# Patient Record
Sex: FEMALE | Race: WHITE | NOT HISPANIC OR LATINO | Employment: OTHER | RURAL
[De-identification: names, ages, dates, MRNs, and addresses within clinical notes are randomized per-mention and may not be internally consistent; named-entity substitution may affect disease eponyms.]

---

## 2019-12-12 ENCOUNTER — OFFICE VISIT (OUTPATIENT)
Dept: FAMILY MEDICINE CLINIC | Facility: CLINIC | Age: 69
End: 2019-12-12

## 2019-12-12 ENCOUNTER — APPOINTMENT (OUTPATIENT)
Dept: LAB | Facility: HOSPITAL | Age: 69
End: 2019-12-12

## 2019-12-12 VITALS
DIASTOLIC BLOOD PRESSURE: 87 MMHG | HEIGHT: 66 IN | OXYGEN SATURATION: 97 % | HEART RATE: 106 BPM | WEIGHT: 229 LBS | RESPIRATION RATE: 18 BRPM | TEMPERATURE: 98.2 F | BODY MASS INDEX: 36.8 KG/M2 | SYSTOLIC BLOOD PRESSURE: 127 MMHG

## 2019-12-12 DIAGNOSIS — D64.9 ANEMIA, UNSPECIFIED TYPE: ICD-10-CM

## 2019-12-12 DIAGNOSIS — M79.7 FIBROMYALGIA: Primary | ICD-10-CM

## 2019-12-12 DIAGNOSIS — Z13.29 SCREENING FOR THYROID DISORDER: ICD-10-CM

## 2019-12-12 DIAGNOSIS — Z13.1 SCREENING FOR DIABETES MELLITUS: ICD-10-CM

## 2019-12-12 DIAGNOSIS — E66.9 OBESITY (BMI 35.0-39.9 WITHOUT COMORBIDITY): ICD-10-CM

## 2019-12-12 PROCEDURE — 85007 BL SMEAR W/DIFF WBC COUNT: CPT | Performed by: NURSE PRACTITIONER

## 2019-12-12 PROCEDURE — 80053 COMPREHEN METABOLIC PANEL: CPT | Performed by: NURSE PRACTITIONER

## 2019-12-12 PROCEDURE — 85025 COMPLETE CBC W/AUTO DIFF WBC: CPT | Performed by: NURSE PRACTITIONER

## 2019-12-12 PROCEDURE — 83540 ASSAY OF IRON: CPT | Performed by: NURSE PRACTITIONER

## 2019-12-12 PROCEDURE — 99214 OFFICE O/P EST MOD 30 MIN: CPT | Performed by: NURSE PRACTITIONER

## 2019-12-12 PROCEDURE — 82607 VITAMIN B-12: CPT | Performed by: NURSE PRACTITIONER

## 2019-12-12 PROCEDURE — 84443 ASSAY THYROID STIM HORMONE: CPT | Performed by: NURSE PRACTITIONER

## 2019-12-12 PROCEDURE — 84466 ASSAY OF TRANSFERRIN: CPT | Performed by: NURSE PRACTITIONER

## 2019-12-12 RX ORDER — DULOXETINE 40 MG/1
1 CAPSULE, DELAYED RELEASE ORAL DAILY
COMMUNITY
Start: 2019-12-02 | End: 2020-10-14 | Stop reason: SDUPTHER

## 2019-12-12 RX ORDER — TRAZODONE HYDROCHLORIDE 50 MG/1
1 TABLET ORAL NIGHTLY
COMMUNITY
Start: 2019-12-02 | End: 2020-07-30 | Stop reason: SDUPTHER

## 2019-12-12 RX ORDER — CHOLECALCIFEROL (VITAMIN D3) 25 MCG
1 TABLET,CHEWABLE ORAL DAILY
COMMUNITY

## 2019-12-12 RX ORDER — IBUPROFEN 200 MG
200 TABLET ORAL EVERY 6 HOURS PRN
COMMUNITY
End: 2022-04-25 | Stop reason: ALTCHOICE

## 2019-12-12 RX ORDER — TIZANIDINE 4 MG/1
1 TABLET ORAL EVERY 8 HOURS PRN
COMMUNITY
Start: 2019-12-02 | End: 2020-10-14 | Stop reason: SDUPTHER

## 2019-12-12 RX ORDER — GABAPENTIN 300 MG/1
300 CAPSULE ORAL NIGHTLY
Qty: 30 CAPSULE | Refills: 3 | Status: SHIPPED | OUTPATIENT
Start: 2019-12-12 | End: 2020-05-07

## 2019-12-12 RX ORDER — GABAPENTIN 300 MG/1
300 CAPSULE ORAL 2 TIMES DAILY
COMMUNITY
End: 2019-12-12 | Stop reason: SDUPTHER

## 2019-12-12 RX ORDER — LANOLIN ALCOHOL/MO/W.PET/CERES
400 CREAM (GRAM) TOPICAL DAILY
COMMUNITY

## 2019-12-13 LAB
ALBUMIN SERPL-MCNC: 4.4 G/DL (ref 3.5–5.2)
ALBUMIN/GLOB SERPL: 1.4 G/DL
ALP SERPL-CCNC: 87 U/L (ref 39–117)
ALT SERPL W P-5'-P-CCNC: 12 U/L (ref 1–33)
ANION GAP SERPL CALCULATED.3IONS-SCNC: 11.3 MMOL/L (ref 5–15)
ANISOCYTOSIS BLD QL: ABNORMAL
AST SERPL-CCNC: 11 U/L (ref 1–32)
BILIRUB SERPL-MCNC: 0.6 MG/DL (ref 0.2–1.2)
BUN BLD-MCNC: 12 MG/DL (ref 8–23)
BUN/CREAT SERPL: 12.5 (ref 7–25)
CALCIUM SPEC-SCNC: 9.6 MG/DL (ref 8.6–10.5)
CHLORIDE SERPL-SCNC: 100 MMOL/L (ref 98–107)
CO2 SERPL-SCNC: 27.7 MMOL/L (ref 22–29)
CREAT BLD-MCNC: 0.96 MG/DL (ref 0.57–1)
DEPRECATED RDW RBC AUTO: 36.7 FL (ref 37–54)
ERYTHROCYTE [DISTWIDTH] IN BLOOD BY AUTOMATED COUNT: 15.7 % (ref 12.3–15.4)
GFR SERPL CREATININE-BSD FRML MDRD: 58 ML/MIN/1.73
GLOBULIN UR ELPH-MCNC: 3.1 GM/DL
GLUCOSE BLD-MCNC: 83 MG/DL (ref 65–99)
HCT VFR BLD AUTO: 33 % (ref 34–46.6)
HGB BLD-MCNC: 10.7 G/DL (ref 12–15.9)
IRON 24H UR-MRATE: 128 MCG/DL (ref 37–145)
IRON SATN MFR SERPL: 30 % (ref 20–50)
LYMPHOCYTES # BLD MANUAL: 3.16 10*3/MM3 (ref 0.7–3.1)
LYMPHOCYTES NFR BLD MANUAL: 40.4 % (ref 19.6–45.3)
LYMPHOCYTES NFR BLD MANUAL: 9.1 % (ref 5–12)
MCH RBC QN AUTO: 21.8 PG (ref 26.6–33)
MCHC RBC AUTO-ENTMCNC: 32.4 G/DL (ref 31.5–35.7)
MCV RBC AUTO: 67.3 FL (ref 79–97)
MICROCYTES BLD QL: ABNORMAL
MONOCYTES # BLD AUTO: 0.71 10*3/MM3 (ref 0.1–0.9)
NEUTROPHILS # BLD AUTO: 3.95 10*3/MM3 (ref 1.7–7)
NEUTROPHILS NFR BLD MANUAL: 50.5 % (ref 42.7–76)
NRBC SPEC MANUAL: 2 /100 WBC (ref 0–0.2)
PLAT MORPH BLD: NORMAL
PLATELET # BLD AUTO: 305 10*3/MM3 (ref 140–450)
PMV BLD AUTO: 10.5 FL (ref 6–12)
POIKILOCYTOSIS BLD QL SMEAR: ABNORMAL
POTASSIUM BLD-SCNC: 4.3 MMOL/L (ref 3.5–5.2)
PROT SERPL-MCNC: 7.5 G/DL (ref 6–8.5)
RBC # BLD AUTO: 4.9 10*6/MM3 (ref 3.77–5.28)
SODIUM BLD-SCNC: 139 MMOL/L (ref 136–145)
TARGETS BLD QL SMEAR: ABNORMAL
TIBC SERPL-MCNC: 422 MCG/DL (ref 298–536)
TRANSFERRIN SERPL-MCNC: 283 MG/DL (ref 200–360)
TSH SERPL DL<=0.05 MIU/L-ACNC: 2.07 UIU/ML (ref 0.27–4.2)
VIT B12 BLD-MCNC: >2000 PG/ML (ref 211–946)
WBC MORPH BLD: NORMAL
WBC NRBC COR # BLD: 7.82 10*3/MM3 (ref 3.4–10.8)

## 2019-12-23 ENCOUNTER — TELEPHONE (OUTPATIENT)
Dept: FAMILY MEDICINE CLINIC | Facility: CLINIC | Age: 69
End: 2019-12-23

## 2019-12-23 DIAGNOSIS — D50.9 IRON DEFICIENCY ANEMIA, UNSPECIFIED IRON DEFICIENCY ANEMIA TYPE: Primary | ICD-10-CM

## 2019-12-23 RX ORDER — FERROUS SULFATE 325(65) MG
325 TABLET ORAL
Qty: 30 TABLET | Refills: 5 | Status: SHIPPED | OUTPATIENT
Start: 2019-12-23 | End: 2020-09-08

## 2019-12-23 NOTE — TELEPHONE ENCOUNTER
----- Message from LAVELL Hood sent at 12/23/2019  4:58 PM CST -----  I've sent in her some iron.  She needs to parag her levels in 6 weeks.

## 2019-12-26 ENCOUNTER — TELEPHONE (OUTPATIENT)
Dept: FAMILY MEDICINE CLINIC | Facility: CLINIC | Age: 69
End: 2019-12-26

## 2019-12-26 NOTE — TELEPHONE ENCOUNTER
Mail service is asking for a electronic order to be sent over for the gabapentin so that they can get it sent out to here.

## 2019-12-27 NOTE — TELEPHONE ENCOUNTER
Spoke with patient advised that gabapentin is a scheduled drug in the state of [place] and she has already been giving a script for it this month.  Can not give another.

## 2020-01-17 ENCOUNTER — TELEPHONE (OUTPATIENT)
Dept: FAMILY MEDICINE CLINIC | Facility: CLINIC | Age: 70
End: 2020-01-17

## 2020-01-17 DIAGNOSIS — D64.9 ANEMIA, UNSPECIFIED TYPE: Primary | ICD-10-CM

## 2020-01-17 NOTE — TELEPHONE ENCOUNTER
Patient called in stating she was told to come back in a few weeks to have her iron checked. She has requested labs be ordered so she can go ahead and have them completed. Please call patient once labs are active. Call back number is [phone]

## 2020-01-27 ENCOUNTER — LAB (OUTPATIENT)
Dept: LAB | Facility: HOSPITAL | Age: 70
End: 2020-01-27

## 2020-01-27 DIAGNOSIS — D64.9 ANEMIA, UNSPECIFIED TYPE: ICD-10-CM

## 2020-01-27 PROCEDURE — 83540 ASSAY OF IRON: CPT

## 2020-01-27 PROCEDURE — 84466 ASSAY OF TRANSFERRIN: CPT

## 2020-01-28 LAB
IRON 24H UR-MRATE: 102 MCG/DL (ref 37–145)
IRON SATN MFR SERPL: 25 % (ref 20–50)
TIBC SERPL-MCNC: 413 MCG/DL (ref 298–536)
TRANSFERRIN SERPL-MCNC: 277 MG/DL (ref 200–360)

## 2020-02-13 ENCOUNTER — TELEPHONE (OUTPATIENT)
Dept: FAMILY MEDICINE CLINIC | Facility: CLINIC | Age: 70
End: 2020-02-13

## 2020-02-13 DIAGNOSIS — R07.0 THROAT DISCOMFORT: Primary | ICD-10-CM

## 2020-02-13 NOTE — TELEPHONE ENCOUNTER
Pt requested a referral put in for the ENT that practices upstairs, either Dr. Medrano or Dr. Winters.    Sofía can be reached at [phone]

## 2020-02-19 ENCOUNTER — OFFICE VISIT (OUTPATIENT)
Dept: OTOLARYNGOLOGY | Facility: CLINIC | Age: 70
End: 2020-02-19

## 2020-02-19 VITALS — HEIGHT: 66 IN | TEMPERATURE: 96.7 F | WEIGHT: 231 LBS | BODY MASS INDEX: 37.12 KG/M2

## 2020-02-19 DIAGNOSIS — R13.14 PHARYNGOESOPHAGEAL DYSPHAGIA: ICD-10-CM

## 2020-02-19 DIAGNOSIS — K21.9 LARYNGOPHARYNGEAL REFLUX (LPR): Primary | ICD-10-CM

## 2020-02-19 PROCEDURE — 99203 OFFICE O/P NEW LOW 30 MIN: CPT | Performed by: OTOLARYNGOLOGY

## 2020-02-19 PROCEDURE — 31575 DIAGNOSTIC LARYNGOSCOPY: CPT | Performed by: OTOLARYNGOLOGY

## 2020-02-19 RX ORDER — SUCRALFATE 1 G/1
1 TABLET ORAL NIGHTLY
Qty: 30 TABLET | Refills: 2 | Status: SHIPPED | OUTPATIENT
Start: 2020-02-19 | End: 2020-06-01

## 2020-02-19 RX ORDER — OMEPRAZOLE 40 MG/1
40 CAPSULE, DELAYED RELEASE ORAL DAILY
Qty: 30 CAPSULE | Refills: 3 | Status: SHIPPED | OUTPATIENT
Start: 2020-02-19 | End: 2020-07-17

## 2020-02-19 NOTE — PROGRESS NOTES
Subjective   Sofía Clarke is a 69 y.o. female.   Choking episodes  History of Present Illness   Patient has a known history of reflux and hiatal hernia that she is currently not being treated she has had EGDs before and said she has stomach polyps and had a hiatal hernia.  She says it 2 and half years ago she choked sharp piece of chicken and had no further choking episodes until recently last week or 2 where she choked on cheeseburger.  She states she normally controls her swallowing by eating small bites but may have eaten a larger bite she not any neck mass nor said no sore throat occasionally has some hoarseness those not severe and has a lump sensation globus in her throat      The following portions of the patient's history were reviewed and updated as appropriate: allergies, current medications, past family history, past medical history, past social history, past surgical history and problem list.      Sofía Clarke reports that she has never smoked. She has never used smokeless tobacco. She reports that she drank alcohol. She reports that she has current or past drug history. Drug: Marijuana.  Patient is not a tobacco user and has not been counseled for use of tobacco products    Family History   Problem Relation Age of Onset   • Emphysema Mother    • Dementia Mother    • Diabetes Father    • Cancer Father    • No Known Problems Sister    • Hypertension Brother    • Diabetes Paternal Grandmother    • Cancer Paternal Grandfather    • Cancer Brother          Current Outpatient Medications:   •  DULoxetine HCl 40 MG capsule delayed-release particles, Take 1 tablet by mouth Daily., Disp: , Rfl:   •  ferrous sulfate (IRON SUPPLEMENT) 325 (65 FE) MG tablet, Take 1 tablet by mouth Daily With Breakfast., Disp: 30 tablet, Rfl: 5  •  folic acid (FOLVITE) 400 MCG tablet, Take 400 mcg by mouth Daily., Disp: , Rfl:   •  gabapentin (NEURONTIN) 300 MG capsule, Take 1 capsule by mouth Every Night., Disp: 30 capsule, Rfl:  3  •  ibuprofen (ADVIL,MOTRIN) 200 MG tablet, Take 200 mg by mouth Every 6 (Six) Hours As Needed for Mild Pain ., Disp: , Rfl:   •  tiZANidine (ZANAFLEX) 4 MG tablet, Take 1 tablet by mouth Every 8 (Eight) Hours., Disp: , Rfl:   •  traZODone (DESYREL) 50 MG tablet, Take 1 tablet by mouth Every Night., Disp: , Rfl:   •  Cyanocobalamin (B-12) 1000 MCG capsule, Take 1 tablet by mouth Daily., Disp: , Rfl:   •  omeprazole (priLOSEC) 40 MG capsule, Take 1 capsule by mouth Daily., Disp: 30 capsule, Rfl: 3  •  sucralfate (CARAFATE) 1 g tablet, Take 1 tablet by mouth Every Night., Disp: 30 tablet, Rfl: 2    Allergies   Allergen Reactions   • Aspirin GI Intolerance   • Codeine Nausea And Vomiting   • Lyrica [Pregabalin] Mental Status Change   • Penicillins GI Intolerance       History reviewed. No pertinent past medical history.    Past Surgical History:   Procedure Laterality Date   • CARDIAC SURGERY  2000    cath ablation-vein   • WISDOM TOOTH EXTRACTION         Review of Systems   HENT: Positive for congestion, sore throat, trouble swallowing and voice change.    Eyes: Positive for visual disturbance.   Respiratory: Positive for choking and wheezing.    Cardiovascular: Positive for chest pain.   Gastrointestinal: Positive for vomiting.   Musculoskeletal: Positive for back pain.   Hematological: Bruises/bleeds easily.   All other systems reviewed and are negative.          Objective   Physical Exam   Constitutional: She appears well-developed.   HENT:   Head: Normocephalic.   Right Ear: Hearing, tympanic membrane and external ear normal.   Left Ear: Hearing, tympanic membrane, external ear and ear canal normal.   Nose: Nose normal.   Mouth/Throat: Uvula is midline, oropharynx is clear and moist and mucous membranes are normal. Tonsils are 2+ on the right. Tonsils are 2+ on the left. No tonsillar exudate.   Eyes: Conjunctivae are normal.   Neck: Neck supple. No tracheal deviation present. No thyromegaly present.        Pulmonary/Chest: Effort normal.   Lymphadenopathy:     She has no cervical adenopathy.   Neurological: She is alert.   Skin: Skin is warm.   Psychiatric: She has a normal mood and affect. Thought content normal.   Vitals reviewed.        Procedure Note    Pre-operative Diagnosis:   Chief Complaint   Patient presents with   • Sore Throat     ref: Keith Anand choking episode    Post-operative Diagnosis: same    Anesthesia: topical with xylocaine and neosynephrine    Endoscopy Type:  Flexible Laryngoscopy    Procedure Details:    The patient was placed in the sitting position.  After topical anesthesia and decongestion, the 4 mm laryngoscope was passed.  The nasal cavities, nasopharynx, oropharynx, hypopharynx, and larynx were all examined.  Vocal cords were examined during respiration and phonation.  The following findings were noted:    Findings: Previously noted nasal findings were confirmed. Nasopharynx without mass, hypopharynx and larynx without evidence of neoplasm. Vocal cord mobility intact. There is chronic appearing edema and erythema of the laryngeal structures consistent with chronic laryngitis.    Condition:  Stable.  Patient tolerated procedure well.    Complications:  None  Evidence of foreign body was seen in no mass there is mild diffuse swelling the base of tongue and posterior larynx her airway is good  Assessment/Plan   Sofía was seen today for sore throat.    Diagnoses and all orders for this visit:    Laryngopharyngeal reflux (LPR)    Pharyngoesophageal dysphagia    Other orders  -     sucralfate (CARAFATE) 1 g tablet; Take 1 tablet by mouth Every Night.  -     omeprazole (priLOSEC) 40 MG capsule; Take 1 capsule by mouth Daily.      Has several options including getting a video swallow but since she is only had 2 episodes of this I think it be appropriate to treat that the LPR which she is known to have we discussed the importance of reflux precautions weight loss not eating late at  night and how to take omeprazole on empty stomach 45 minutes before she eats and also to take the Carafate at night  We will reassess in 6 weeks if she has any problems or questions she is let us know if she notes no improvement next 3 to 4 weeks she is to let us know prior to follow-up

## 2020-02-19 NOTE — PATIENT INSTRUCTIONS

## 2020-03-12 ENCOUNTER — APPOINTMENT (OUTPATIENT)
Dept: LAB | Facility: HOSPITAL | Age: 70
End: 2020-03-12

## 2020-03-12 ENCOUNTER — PROCEDURE VISIT (OUTPATIENT)
Dept: FAMILY MEDICINE CLINIC | Facility: CLINIC | Age: 70
End: 2020-03-12

## 2020-03-12 VITALS
TEMPERATURE: 98 F | SYSTOLIC BLOOD PRESSURE: 98 MMHG | HEIGHT: 66 IN | DIASTOLIC BLOOD PRESSURE: 78 MMHG | BODY MASS INDEX: 37.28 KG/M2 | WEIGHT: 232 LBS | RESPIRATION RATE: 20 BRPM | HEART RATE: 115 BPM | OXYGEN SATURATION: 97 %

## 2020-03-12 DIAGNOSIS — M25.50 ARTHRALGIA, UNSPECIFIED JOINT: ICD-10-CM

## 2020-03-12 DIAGNOSIS — Z12.4 ENCOUNTER FOR SCREENING FOR CERVICAL CANCER: Primary | ICD-10-CM

## 2020-03-12 DIAGNOSIS — E66.01 MORBIDLY OBESE (HCC): ICD-10-CM

## 2020-03-12 DIAGNOSIS — Z12.31 ENCOUNTER FOR SCREENING MAMMOGRAM FOR MALIGNANT NEOPLASM OF BREAST: ICD-10-CM

## 2020-03-12 PROCEDURE — 86038 ANTINUCLEAR ANTIBODIES: CPT | Performed by: NURSE PRACTITIONER

## 2020-03-12 PROCEDURE — 86140 C-REACTIVE PROTEIN: CPT | Performed by: NURSE PRACTITIONER

## 2020-03-12 PROCEDURE — 85651 RBC SED RATE NONAUTOMATED: CPT | Performed by: NURSE PRACTITIONER

## 2020-03-12 PROCEDURE — G0123 SCREEN CERV/VAG THIN LAYER: HCPCS | Performed by: NURSE PRACTITIONER

## 2020-03-12 PROCEDURE — 86431 RHEUMATOID FACTOR QUANT: CPT | Performed by: NURSE PRACTITIONER

## 2020-03-12 PROCEDURE — 99213 OFFICE O/P EST LOW 20 MIN: CPT | Performed by: NURSE PRACTITIONER

## 2020-03-12 NOTE — PROGRESS NOTES
"Subjective   History of Present Illness    Sofía Clarke is a 69 y.o. female who presents for annual exam.  She also says her fibromyalgia and joint pain are getting worse.  Obstetric History:   1, para 1   Menstrual History:     No LMP recorded. Patient is postmenopausal.   went through menopause in     Sexual History:     Denies being sexually active    Current contraception: post menopausal status  History of abnormal Pap smear: no  SHARDA exposure in utero: no  Received Gardasil immunization: no  Perform regular self breast exam: yes - regular  Family history of uterine or ovarian cancer: no  Family History of cervical cancer: no  Family History of colon cancer/colon polyps: no  Regular self breast exam: yes  History of abnormal mammogram: no  Family history of breast cancer: no  History of abnormal lipids: no    The following portions of the patient's history were reviewed and updated as appropriate: allergies, current medications, past family history, past medical history, past social history, past surgical history and problem list.    Review of Systems   Constitutional: Negative.    HENT: Negative.    Eyes: Negative.    Respiratory: Negative.    Cardiovascular: Negative.    Gastrointestinal: Negative.    Genitourinary: Negative.    Musculoskeletal: Positive for arthralgias and myalgias.   Skin: Negative.    Neurological: Negative.    Psychiatric/Behavioral: Negative.        Pertinent items are noted in HPI.     Objective   Physical Exam   Constitutional: She appears well-developed and well-nourished. No distress.   Genitourinary: Rectal exam shows guaiac negative stool.   Musculoskeletal:   Tenderness multiple sites, joint pain multiple sites also    Nursing note and vitals reviewed.      BP 98/78 (BP Location: Right arm, Patient Position: Sitting, Cuff Size: Adult)   Pulse 115   Temp 98 °F (36.7 °C) (Tympanic)   Resp 20   Ht 167.6 cm (66\")   Wt 105 kg (232 lb)   SpO2 97%   BMI 37.45 kg/m² "     General:   alert, appears stated age and cooperative   Heart: regular rate and rhythm, S1, S2 normal, no murmur, click, rub or gallop   Lungs: clear to auscultation bilaterally   Abdomen: soft, non-tender, without masses or organomegaly   Breast: inspection negative, no nipple discharge or bleeding, no masses or nodularity palpable   Vulva: normal   Vagina: normal mucosa   Cervix: no lesions   Uterus: normal size, non-tender   Adnexa: normal adnexa     Assessment/Plan   Sofía was seen today for gynecologic exam and fibromyalgia.    Diagnoses and all orders for this visit:    Encounter for screening for cervical cancer   -     Liquid-based Pap Smear, Screening    Arthralgia, unspecified joint  -     Rheumatoid factor  -     C-reactive protein  -     Sedimentation rate, automated  -     KATHLEEN    Morbidly obese (CMS/HCC)  Comments:  bmi 37.5 diet and exercise info given    Encounter for screening mammogram for malignant neoplasm of breast   -     Mammo Screening Bilateral With CAD; Future        Await pap smear results.

## 2020-03-13 LAB
CHROMATIN AB SERPL-ACNC: <10 IU/ML (ref 0–14)
CRP SERPL-MCNC: 0.53 MG/DL (ref 0–0.5)
ERYTHROCYTE [SEDIMENTATION RATE] IN BLOOD: 8 MM/HR (ref 0–20)

## 2020-03-14 LAB — ANA SER QL: NEGATIVE

## 2020-03-16 PROBLEM — E66.01 MORBIDLY OBESE: Status: ACTIVE | Noted: 2020-03-16

## 2020-03-16 NOTE — PATIENT INSTRUCTIONS
Calorie Counting for Weight Loss  Calories are units of energy. Your body needs a certain amount of calories from food to keep you going throughout the day. When you eat more calories than your body needs, your body stores the extra calories as fat. When you eat fewer calories than your body needs, your body burns fat to get the energy it needs.  Calorie counting means keeping track of how many calories you eat and drink each day. Calorie counting can be helpful if you need to lose weight. If you make sure to eat fewer calories than your body needs, you should lose weight. Ask your health care provider what a healthy weight is for you.  For calorie counting to work, you will need to eat the right number of calories in a day in order to lose a healthy amount of weight per week. A dietitian can help you determine how many calories you need in a day and will give you suggestions on how to reach your calorie goal.  · A healthy amount of weight to lose per week is usually 1-2 lb (0.5-0.9 kg). This usually means that your daily calorie intake should be reduced by 500-750 calories.  · Eating 1,200 - 1,500 calories per day can help most women lose weight.  · Eating 1,500 - 1,800 calories per day can help most men lose weight.  What is my plan?  My goal is to have __________ calories per day.  If I have this many calories per day, I should lose around __________ pounds per week.  What do I need to know about calorie counting?  In order to meet your daily calorie goal, you will need to:  · Find out how many calories are in each food you would like to eat. Try to do this before you eat.  · Decide how much of the food you plan to eat.  · Write down what you ate and how many calories it had. Doing this is called keeping a food log.  To successfully lose weight, it is important to balance calorie counting with a healthy lifestyle that includes regular activity. Aim for 150 minutes of moderate exercise (such as walking) or 75  minutes of vigorous exercise (such as running) each week.  Where do I find calorie information?    The number of calories in a food can be found on a Nutrition Facts label. If a food does not have a Nutrition Facts label, try to look up the calories online or ask your dietitian for help.  Remember that calories are listed per serving. If you choose to have more than one serving of a food, you will have to multiply the calories per serving by the amount of servings you plan to eat. For example, the label on a package of bread might say that a serving size is 1 slice and that there are 90 calories in a serving. If you eat 1 slice, you will have eaten 90 calories. If you eat 2 slices, you will have eaten 180 calories.  How do I keep a food log?  Immediately after each meal, record the following information in your food log:  · What you ate. Don't forget to include toppings, sauces, and other extras on the food.  · How much you ate. This can be measured in cups, ounces, or number of items.  · How many calories each food and drink had.  · The total number of calories in the meal.  Keep your food log near you, such as in a small notebook in your pocket, or use a mobile kate or website. Some programs will calculate calories for you and show you how many calories you have left for the day to meet your goal.  What are some calorie counting tips?    · Use your calories on foods and drinks that will fill you up and not leave you hungry:  ? Some examples of foods that fill you up are nuts and nut butters, vegetables, lean proteins, and high-fiber foods like whole grains. High-fiber foods are foods with more than 5 g fiber per serving.  ? Drinks such as sodas, specialty coffee drinks, alcohol, and juices have a lot of calories, yet do not fill you up.  · Eat nutritious foods and avoid empty calories. Empty calories are calories you get from foods or beverages that do not have many vitamins or protein, such as candy, sweets, and  "soda. It is better to have a nutritious high-calorie food (such as an avocado) than a food with few nutrients (such as a bag of chips).  · Know how many calories are in the foods you eat most often. This will help you calculate calorie counts faster.  · Pay attention to calories in drinks. Low-calorie drinks include water and unsweetened drinks.  · Pay attention to nutrition labels for \"low fat\" or \"fat free\" foods. These foods sometimes have the same amount of calories or more calories than the full fat versions. They also often have added sugar, starch, or salt, to make up for flavor that was removed with the fat.  · Find a way of tracking calories that works for you. Get creative. Try different apps or programs if writing down calories does not work for you.  What are some portion control tips?  · Know how many calories are in a serving. This will help you know how many servings of a certain food you can have.  · Use a measuring cup to measure serving sizes. You could also try weighing out portions on a kitchen scale. With time, you will be able to estimate serving sizes for some foods.  · Take some time to put servings of different foods on your favorite plates, bowls, and cups so you know what a serving looks like.  · Try not to eat straight from a bag or box. Doing this can lead to overeating. Put the amount you would like to eat in a cup or on a plate to make sure you are eating the right portion.  · Use smaller plates, glasses, and bowls to prevent overeating.  · Try not to multitask (for example, watch TV or use your computer) while eating. If it is time to eat, sit down at a table and enjoy your food. This will help you to know when you are full. It will also help you to be aware of what you are eating and how much you are eating.  What are tips for following this plan?  Reading food labels  · Check the calorie count compared to the serving size. The serving size may be smaller than what you are used to " "eating.  · Check the source of the calories. Make sure the food you are eating is high in vitamins and protein and low in saturated and trans fats.  Shopping  · Read nutrition labels while you shop. This will help you make healthy decisions before you decide to purchase your food.  · Make a grocery list and stick to it.  Cooking  · Try to cook your favorite foods in a healthier way. For example, try baking instead of frying.  · Use low-fat dairy products.  Meal planning  · Use more fruits and vegetables. Half of your plate should be fruits and vegetables.  · Include lean proteins like poultry and fish.  How do I count calories when eating out?  · Ask for smaller portion sizes.  · Consider sharing an entree and sides instead of getting your own entree.  · If you get your own entree, eat only half. Ask for a box at the beginning of your meal and put the rest of your entree in it so you are not tempted to eat it.  · If calories are listed on the menu, choose the lower calorie options.  · Choose dishes that include vegetables, fruits, whole grains, low-fat dairy products, and lean protein.  · Choose items that are boiled, broiled, grilled, or steamed. Stay away from items that are buttered, battered, fried, or served with cream sauce. Items labeled \"crispy\" are usually fried, unless stated otherwise.  · Choose water, low-fat milk, unsweetened iced tea, or other drinks without added sugar. If you want an alcoholic beverage, choose a lower calorie option such as a glass of wine or light beer.  · Ask for dressings, sauces, and syrups on the side. These are usually high in calories, so you should limit the amount you eat.  · If you want a salad, choose a garden salad and ask for grilled meats. Avoid extra toppings like noble, cheese, or fried items. Ask for the dressing on the side, or ask for olive oil and vinegar or lemon to use as dressing.  · Estimate how many servings of a food you are given. For example, a serving of " cooked rice is ½ cup or about the size of half a baseball. Knowing serving sizes will help you be aware of how much food you are eating at restaurants. The list below tells you how big or small some common portion sizes are based on everyday objects:  ? 1 oz--4 stacked dice.  ? 3 oz--1 deck of cards.  ? 1 tsp--1 die.  ? 1 Tbsp--½ a ping-pong ball.  ? 2 Tbsp--1 ping-pong ball.  ? ½ cup--½ baseball.  ? 1 cup--1 baseball.  Summary  · Calorie counting means keeping track of how many calories you eat and drink each day. If you eat fewer calories than your body needs, you should lose weight.  · A healthy amount of weight to lose per week is usually 1-2 lb (0.5-0.9 kg). This usually means reducing your daily calorie intake by 500-750 calories.  · The number of calories in a food can be found on a Nutrition Facts label. If a food does not have a Nutrition Facts label, try to look up the calories online or ask your dietitian for help.  · Use your calories on foods and drinks that will fill you up, and not on foods and drinks that will leave you hungry.  · Use smaller plates, glasses, and bowls to prevent overeating.  This information is not intended to replace advice given to you by your health care provider. Make sure you discuss any questions you have with your health care provider.  Document Released: 12/18/2006 Document Revised: 09/06/2019 Document Reviewed: 11/17/2017  [employer] Interactive Patient Education © 2020 [employer]      Exercising to Lose Weight  Exercise is structured, repetitive physical activity to improve fitness and health. Getting regular exercise is important for everyone. It is especially important if you are overweight. Being overweight increases your risk of heart disease, stroke, diabetes, high blood pressure, and several types of cancer. Reducing your calorie intake and exercising can help you lose weight.  Exercise is usually categorized as moderate or vigorous intensity. To lose weight, most  people need to do a certain amount of moderate-intensity or vigorous-intensity exercise each week.  Moderate-intensity exercise    Moderate-intensity exercise is any activity that gets you moving enough to burn at least three times more energy (calories) than if you were sitting.  Examples of moderate exercise include:  · Walking a mile in 15 minutes.  · Doing light yard work.  · Biking at an easy pace.  Most people should get at least 150 minutes (2 hours and 30 minutes) a week of moderate-intensity exercise to maintain their body weight.  Vigorous-intensity exercise  Vigorous-intensity exercise is any activity that gets you moving enough to burn at least six times more calories than if you were sitting. When you exercise at this intensity, you should be working hard enough that you are not able to carry on a conversation.  Examples of vigorous exercise include:  · Running.  · Playing a team sport, such as football, basketball, and soccer.  · Jumping rope.  Most people should get at least 75 minutes (1 hour and 15 minutes) a week of vigorous-intensity exercise to maintain their body weight.  How can exercise affect me?  When you exercise enough to burn more calories than you eat, you lose weight. Exercise also reduces body fat and builds muscle. The more muscle you have, the more calories you burn. Exercise also:  · Improves mood.  · Reduces stress and tension.  · Improves your overall fitness, flexibility, and endurance.  · Increases bone strength.  The amount of exercise you need to lose weight depends on:  · Your age.  · The type of exercise.  · Any health conditions you have.  · Your overall physical ability.  Talk to your health care provider about how much exercise you need and what types of activities are safe for you.  What actions can I take to lose weight?  Nutrition    · Make changes to your diet as told by your health care provider or diet and nutrition specialist (dietitian). This may  include:  ? Eating fewer calories.  ? Eating more protein.  ? Eating less unhealthy fats.  ? Eating a diet that includes fresh fruits and vegetables, whole grains, low-fat dairy products, and lean protein.  ? Avoiding foods with added fat, salt, and sugar.  · Drink plenty of water while you exercise to prevent dehydration or heat stroke.  Activity  · Choose an activity that you enjoy and set realistic goals. Your health care provider can help you make an exercise plan that works for you.  · Exercise at a moderate or vigorous intensity most days of the week.  ? The intensity of exercise may vary from person to person. You can tell how intense a workout is for you by paying attention to your breathing and heartbeat. Most people will notice their breathing and heartbeat get faster with more intense exercise.  · Do resistance training twice each week, such as:  ? Push-ups.  ? Sit-ups.  ? Lifting weights.  ? Using resistance bands.  · Getting short amounts of exercise can be just as helpful as long structured periods of exercise. If you have trouble finding time to exercise, try to include exercise in your daily routine.  ? Get up, stretch, and walk around every 30 minutes throughout the day.  ? Go for a walk during your lunch break.  ? Park your car farther away from your destination.  ? If you take public transportation, get off one stop early and walk the rest of the way.  ? Make phone calls while standing up and walking around.  ? Take the stairs instead of elevators or escalators.  · Wear comfortable clothes and shoes with good support.  · Do not exercise so much that you hurt yourself, feel dizzy, or get very short of breath.  Where to find more information  · [place] Department of Health and Human Services: www.hhs.gov  · Centers for Disease Control and Prevention (CDC): www.cdc.gov  Contact a health care provider:  · Before starting a new exercise program.  · If you have questions or concerns about your  weight.  · If you have a medical problem that keeps you from exercising.  Get help right away if you have any of the following while exercising:  · Injury.  · Dizziness.  · Difficulty breathing or shortness of breath that does not go away when you stop exercising.  · Chest pain.  · Rapid heartbeat.  Summary  · Being overweight increases your risk of heart disease, stroke, diabetes, high blood pressure, and several types of cancer.  · Losing weight happens when you burn more calories than you eat.  · Reducing the amount of calories you eat in addition to getting regular moderate or vigorous exercise each week helps you lose weight.  This information is not intended to replace advice given to you by your health care provider. Make sure you discuss any questions you have with your health care provider.  Document Released: 01/20/2012 Document Revised: 12/31/2018 Document Reviewed: 12/31/2018  [employer] Interactive Patient Education © 2020 [employer]

## 2020-03-17 LAB
GEN CATEG CVX/VAG CYTO-IMP: NORMAL
LAB AP CASE REPORT: NORMAL
LAB AP GYN ADDITIONAL INFORMATION: NORMAL
PATH INTERP SPEC-IMP: NORMAL
STAT OF ADQ CVX/VAG CYTO-IMP: NORMAL

## 2020-04-07 ENCOUNTER — TELEPHONE (OUTPATIENT)
Dept: FAMILY MEDICINE CLINIC | Facility: CLINIC | Age: 70
End: 2020-04-07

## 2020-04-29 ENCOUNTER — OFFICE VISIT (OUTPATIENT)
Dept: OTOLARYNGOLOGY | Facility: CLINIC | Age: 70
End: 2020-04-29

## 2020-04-29 VITALS — WEIGHT: 233 LBS | BODY MASS INDEX: 37.45 KG/M2 | TEMPERATURE: 98.5 F | HEIGHT: 66 IN

## 2020-04-29 DIAGNOSIS — R13.14 PHARYNGOESOPHAGEAL DYSPHAGIA: Primary | ICD-10-CM

## 2020-04-29 PROCEDURE — 99213 OFFICE O/P EST LOW 20 MIN: CPT | Performed by: OTOLARYNGOLOGY

## 2020-04-29 RX ORDER — LANOLIN ALCOHOL/MO/W.PET/CERES
25 CREAM (GRAM) TOPICAL DAILY
COMMUNITY

## 2020-04-29 NOTE — PATIENT INSTRUCTIONS
MyPlate from USDA    MyPlate is an outline of a general healthy diet based on the 2010 Dietary Guidelines for Americans, from the U.S. Department of Agriculture (USDA). It sets guidelines for how much food you should eat from each food group based on your age, sex, and level of physical activity.  What are tips for following MyPlate?  To follow MyPlate recommendations:  · Eat a wide variety of fruits and vegetables, grains, and protein foods.  · Serve smaller portions and eat less food throughout the day.  · Limit portion sizes to avoid overeating.  · Enjoy your food.  · Get at least 150 minutes of exercise every week. This is about 30 minutes each day, 5 or more days per week.  It can be difficult to have every meal look like MyPlate. Think about MyPlate as eating guidelines for an entire day, rather than each individual meal.  Fruits and vegetables  · Make half of your plate fruits and vegetables.  · Eat many different colors of fruits and vegetables each day.  · For a 2,000 calorie daily food plan, eat:  ? 2½ cups of vegetables every day.  ? 2 cups of fruit every day.  · 1 cup is equal to:  ? 1 cup raw or cooked vegetables.  ? 1 cup raw fruit.  ? 1 medium-sized orange, apple, or banana.  ? 1 cup 100% fruit or vegetable juice.  ? 2 cups raw leafy greens, such as lettuce, spinach, or kale.  ? ½ cup dried fruit.  Grains  · One fourth of your plate should be grains.  · Make at least half of the grains you eat each day whole grains.  · For a 2,000 calorie daily food plan, eat 6 oz of grains every day.  · 1 oz is equal to:  ? 1 slice bread.  ? 1 cup cereal.  ? ½ cup cooked rice, cereal, or pasta.  Protein  · One fourth of your plate should be protein.  · Eat a wide variety of protein foods, including meat, poultry, fish, eggs, beans, nuts, and tofu.  · For a 2,000 calorie daily food plan, eat 5½ oz of protein every day.  · 1 oz is equal to:  ? 1 oz meat, poultry, or fish.  ? ¼ cup cooked beans.  ? 1 egg.  ? ½ oz nuts  or seeds.  ? 1 Tbsp peanut butter.  Dairy  · Drink fat-free or low-fat (1%) milk.  · Eat or drink dairy as a side to meals.  · For a 2,000 calorie daily food plan, eat or drink 3 cups of dairy every day.  · 1 cup is equal to:  ? 1 cup milk, yogurt, cottage cheese, or soy milk (soy beverage).  ? 2 oz processed cheese.  ? 1½ oz natural cheese.  Fats, oils, salt, and sugars  · Only small amounts of oils are recommended.  · Avoid foods that are high in calories and low in nutritional value (empty calories), like foods high in fat or added sugars.  · Choose foods that are low in salt (sodium). Choose foods that have less than 140 milligrams (mg) of sodium per serving.  · Drink water instead of sugary drinks. Drink enough water each day to keep your urine pale yellow.  Where to find support  · Work with your health care provider or a nutrition specialist (dietitian) to develop a customized eating plan that is right for you.  · Download an kate (mobile application) to help you track your daily food intake.  Where to find more information  · Go to ChooseMyPlate.gov for more information.  · Learn more and log your daily food intake according to MyPlate using USDA's SuperTracker: www.SputnikBoter.usda.gov  Summary  · MyPlate is a general guideline for healthy eating from the USDA. It is based on the 2010 Dietary Guidelines for Americans.  · In general, fruits and vegetables should take up ½ of your plate, grains should take up ¼ of your plate, and protein should take up ¼ of your plate.  This information is not intended to replace advice given to you by your health care provider. Make sure you discuss any questions you have with your health care provider.  Document Released: 01/06/2009 Document Revised: 03/19/2018 Document Reviewed: 03/19/2018  [employer] Interactive Patient Education © 2020 [employer]

## 2020-04-30 NOTE — PROGRESS NOTES
Subjective   Sofía Clarke is a 69 y.o. female.   Throat issues  History of Present Illness     She had comes back saying she has still the presence of globus she is not really having true pain is more discomfort she has trouble swallowing certain foods get caught in her throat small foods and liquids are easier swallowed is not have any neck swelling or mass is new though she is somewhat tender at times it comes and goes she is not have any fever chills no change in voice no new breathing problems she said she is had an esophagram in the past and is partly normal but it was more than 30 years ago  States she has been taking her medications but has not made too much of a difference she also has problems with bloating her stomach separately      The following portions of the patient's history were reviewed and updated as appropriate: allergies, current medications, past family history, past medical history, past social history, past surgical history and problem list.      Current Outpatient Medications:   •  Cyanocobalamin (B-12) 1000 MCG capsule, Take 1 tablet by mouth Daily., Disp: , Rfl:   •  DULoxetine HCl 40 MG capsule delayed-release particles, Take 1 tablet by mouth Daily., Disp: , Rfl:   •  ferrous sulfate (IRON SUPPLEMENT) 325 (65 FE) MG tablet, Take 1 tablet by mouth Daily With Breakfast., Disp: 30 tablet, Rfl: 5  •  folic acid (FOLVITE) 400 MCG tablet, Take 400 mcg by mouth Daily., Disp: , Rfl:   •  gabapentin (NEURONTIN) 300 MG capsule, Take 1 capsule by mouth Every Night., Disp: 30 capsule, Rfl: 3  •  ibuprofen (ADVIL,MOTRIN) 200 MG tablet, Take 200 mg by mouth Every 6 (Six) Hours As Needed for Mild Pain ., Disp: , Rfl:   •  omeprazole (priLOSEC) 40 MG capsule, Take 1 capsule by mouth Daily., Disp: 30 capsule, Rfl: 3  •  sucralfate (CARAFATE) 1 g tablet, Take 1 tablet by mouth Every Night., Disp: 30 tablet, Rfl: 2  •  tiZANidine (ZANAFLEX) 4 MG tablet, Take 1 tablet by mouth Every 8 (Eight) Hours.,  Disp: , Rfl:   •  traZODone (DESYREL) 50 MG tablet, Take 1 tablet by mouth Every Night., Disp: , Rfl:   •  vitamin B-6 (PYRIDOXINE) 50 MG tablet, Take 25 mg by mouth Daily., Disp: , Rfl:     Allergies   Allergen Reactions   • Aspirin GI Intolerance   • Codeine Nausea And Vomiting   • Lyrica [Pregabalin] Mental Status Change   • Penicillins GI Intolerance             Review of Systems   Constitutional: Negative for fever.   HENT: Positive for trouble swallowing. Negative for sore throat and voice change.    Respiratory: Positive for choking.    Gastrointestinal: Positive for abdominal distention. Negative for vomiting.   Hematological: Negative for adenopathy.           Objective   Physical Exam   Constitutional: She appears well-developed.   HENT:   Head: Normocephalic.   Right Ear: Hearing and external ear normal.   Left Ear: Hearing and external ear normal.   Nose: Nose normal.   Mouth/Throat: Uvula is midline, oropharynx is clear and moist and mucous membranes are normal.       Eyes: Conjunctivae are normal.   Neck:   Ptotic submandibular glands   Pulmonary/Chest: Effort normal.   Lymphadenopathy:     She has no cervical adenopathy.   Neurological: She is alert.   Skin: Skin is warm.   Psychiatric: She has a normal mood and affect.   Nursing note and vitals reviewed.          Assessment/Plan   Sofía was seen today for follow-up.    Diagnoses and all orders for this visit:    Pharyngoesophageal dysphagia  -     [place] esophagram complete; Future    To this the bothersome symptoms and lack of response to medications and getting a barium swallow if that is negative we will send her to GI she is agree with this and knows to call us if she does not hear back from us within a week of the testing then we can plan further follow-up

## 2020-05-06 ENCOUNTER — TRANSCRIBE ORDERS (OUTPATIENT)
Dept: GENERAL RADIOLOGY | Facility: HOSPITAL | Age: 70
End: 2020-05-06

## 2020-05-07 ENCOUNTER — HOSPITAL ENCOUNTER (OUTPATIENT)
Dept: GENERAL RADIOLOGY | Facility: HOSPITAL | Age: 70
End: 2020-05-07

## 2020-05-07 DIAGNOSIS — M79.7 FIBROMYALGIA: ICD-10-CM

## 2020-05-07 RX ORDER — GABAPENTIN 300 MG/1
CAPSULE ORAL
Qty: 30 CAPSULE | Refills: 3 | Status: SHIPPED | OUTPATIENT
Start: 2020-05-07 | End: 2020-09-10

## 2020-05-13 ENCOUNTER — TRANSCRIBE ORDERS (OUTPATIENT)
Dept: INTERVENTIONAL RADIOLOGY/VASCULAR | Facility: HOSPITAL | Age: 70
End: 2020-05-13

## 2020-05-13 ENCOUNTER — TELEPHONE (OUTPATIENT)
Dept: OTOLARYNGOLOGY | Facility: CLINIC | Age: 70
End: 2020-05-13

## 2020-05-13 DIAGNOSIS — Z01.818 PRE-OP TESTING: Primary | ICD-10-CM

## 2020-05-13 NOTE — TELEPHONE ENCOUNTER
05/13/2020, 1355 - Patient telephoned per this staff member [phone].  Patient in agreement to reschedule Fluoroscopy Esophagram Complete and clinical appointment with Dr. Adin Winters M.D./ENT.  Patient made aware procedure rescheduled for Tuesday, May 19, 2020 at 9:00 A.M. at [place], [place].  Patient made aware of need to complete COVID 19 testing Saturday, May 16, 2020 between the hours of 9:00 A.M. - 10:00 A.M. at [facility] and remain quarantined until after procedure.  Patient made aware of clinical appointment date/time with Dr. Adin Winters M.D./ENT Wednesday, May 27, 2020 at 1:00 P.M. at [place], [place].  Patient verbalized understanding.

## 2020-05-14 ENCOUNTER — DOCUMENTATION (OUTPATIENT)
Dept: OTOLARYNGOLOGY | Facility: CLINIC | Age: 70
End: 2020-05-14

## 2020-05-14 NOTE — PROGRESS NOTES
"05/14/2020, 0923 - Completed \"Communication for Patients that are having Elective Radiology Procedure - COVID 19 Testing\" form submitted to patient via mail - [address] [place] [address], [place] [address].  Patient made aware of need to complete COVID 19 Testing Saturday, May 16, 2020 between the hours of 9:00 A.M. and 10:00 A.M. at [facility] [place], and remain self quarantined until after procedure.  Patient made aware Fluoroscopy Esophagram Complete scheduled for completion Tuesday, May 19, 2020 at 9:00 A.M. at [facility].    "

## 2020-05-16 PROCEDURE — U0003 INFECTIOUS AGENT DETECTION BY NUCLEIC ACID (DNA OR RNA); SEVERE ACUTE RESPIRATORY SYNDROME CORONAVIRUS 2 (SARS-COV-2) (CORONAVIRUS DISEASE [COVID-19]), AMPLIFIED PROBE TECHNIQUE, MAKING USE OF HIGH THROUGHPUT TECHNOLOGIES AS DESCRIBED BY CMS-2020-01-R: HCPCS | Performed by: INTERNAL MEDICINE

## 2020-05-18 LAB — SARS-COV-2 RNA RESP QL NAA+PROBE: NOT DETECTED

## 2020-05-19 ENCOUNTER — HOSPITAL ENCOUNTER (OUTPATIENT)
Dept: GENERAL RADIOLOGY | Facility: HOSPITAL | Age: 70
Discharge: HOME | End: 2020-05-19
Admitting: INTERNAL MEDICINE

## 2020-05-19 DIAGNOSIS — R13.14 PHARYNGOESOPHAGEAL DYSPHAGIA: ICD-10-CM

## 2020-05-19 PROCEDURE — 63710000001 BARIUM SULFATE 96 % RECONSTITUTED SUSPENSION: Performed by: RADIOLOGY

## 2020-05-19 PROCEDURE — 74220 X-RAY XM ESOPHAGUS 1CNTRST: CPT

## 2020-05-19 PROCEDURE — A9270 NON-COVERED ITEM OR SERVICE: HCPCS | Performed by: RADIOLOGY

## 2020-05-19 RX ADMIN — BARIUM SULFATE 200 ML: 960 POWDER, FOR SUSPENSION ORAL at 09:22

## 2020-05-27 ENCOUNTER — OFFICE VISIT (OUTPATIENT)
Dept: OTOLARYNGOLOGY | Facility: CLINIC | Age: 70
End: 2020-05-27

## 2020-05-27 VITALS — HEIGHT: 65 IN | TEMPERATURE: 97.1 F | BODY MASS INDEX: 38.82 KG/M2 | WEIGHT: 233 LBS

## 2020-05-27 DIAGNOSIS — K21.9 HIATAL HERNIA WITH GERD: ICD-10-CM

## 2020-05-27 DIAGNOSIS — K21.9 LARYNGOPHARYNGEAL REFLUX (LPR): ICD-10-CM

## 2020-05-27 DIAGNOSIS — R13.14 PHARYNGOESOPHAGEAL DYSPHAGIA: Primary | ICD-10-CM

## 2020-05-27 DIAGNOSIS — K44.9 HIATAL HERNIA WITH GERD: ICD-10-CM

## 2020-05-27 PROCEDURE — 99213 OFFICE O/P EST LOW 20 MIN: CPT | Performed by: OTOLARYNGOLOGY

## 2020-05-27 NOTE — PROGRESS NOTES
Subjective   Sofía Clarke is a 70 y.o. female.   Follow-up globus and throat problems    History of Present Illness   Patient seen GI physician the past but none recently she continues to have reflux despite her medications he feels like there is a lump sensation the mid throat she is not have any severe choking her airway or breathing changes.  She comes back for review of her barium swallow she tells me she is seen physician in the past for this problem but seems to be worsening is not losing a lot of weight back to her weight is stable      The following portions of the patient's history were reviewed and updated as appropriate: allergies, current medications, past family history, past medical history, past social history, past surgical history and problem list.      Current Outpatient Medications:   •  Cyanocobalamin (B-12) 1000 MCG capsule, Take 1 tablet by mouth Daily., Disp: , Rfl:   •  DULoxetine HCl 40 MG capsule delayed-release particles, Take 1 tablet by mouth Daily., Disp: , Rfl:   •  ferrous sulfate (IRON SUPPLEMENT) 325 (65 FE) MG tablet, Take 1 tablet by mouth Daily With Breakfast., Disp: 30 tablet, Rfl: 5  •  folic acid (FOLVITE) 400 MCG tablet, Take 400 mcg by mouth Daily., Disp: , Rfl:   •  gabapentin (NEURONTIN) 300 MG capsule, TAKE ONE CAPSULE BY MOUTH EVERY NIGHT, Disp: 30 capsule, Rfl: 3  •  ibuprofen (ADVIL,MOTRIN) 200 MG tablet, Take 200 mg by mouth Every 6 (Six) Hours As Needed for Mild Pain ., Disp: , Rfl:   •  omeprazole (priLOSEC) 40 MG capsule, Take 1 capsule by mouth Daily., Disp: 30 capsule, Rfl: 3  •  sucralfate (CARAFATE) 1 g tablet, Take 1 tablet by mouth Every Night., Disp: 30 tablet, Rfl: 2  •  tiZANidine (ZANAFLEX) 4 MG tablet, Take 1 tablet by mouth Every 8 (Eight) Hours As Needed for Muscle Spasms., Disp: , Rfl:   •  traZODone (DESYREL) 50 MG tablet, Take 1 tablet by mouth Every Night., Disp: , Rfl:   •  vitamin B-6 (PYRIDOXINE) 50 MG tablet, Take 25 mg by mouth Daily.,  Disp: , Rfl:     Allergies   Allergen Reactions   • Aspirin GI Intolerance   • Codeine Nausea And Vomiting   • Lyrica [Pregabalin] Mental Status Change   • Penicillins GI Intolerance   • Latex Rash             Review of Systems   Constitutional: Negative for fever.   HENT: Positive for trouble swallowing. Negative for congestion, sore throat and voice change.    Respiratory: Negative for stridor.    Gastrointestinal:        Globus   Hematological: Negative for adenopathy.           Objective   Physical Exam   Constitutional: She appears well-developed.   HENT:   Head: Normocephalic.   Right Ear: External ear normal.   Left Ear: External ear normal.   Mouth/Throat: Uvula is midline, oropharynx is clear and moist and mucous membranes are normal.       Eyes: Conjunctivae are normal.   Neck: Neck supple.   Lymphadenopathy:     She has no cervical adenopathy.   Neurological: She is alert.   Skin: Skin is warm.   Psychiatric: She has a normal mood and affect. Thought content normal.   Nursing note and vitals reviewed.          Assessment/Plan   Sofía was seen today for sore throat, sensation of lump in throat and reflux of food items.    Diagnoses and all orders for this visit:    Pharyngoesophageal dysphagia  -     Ambulatory Referral to Gastroenterology    Laryngopharyngeal reflux (LPR)  -     Ambulatory Referral to Gastroenterology    Hiatal hernia with GERD    Because of persistent symptoms of failed medical therapy and referred to GI they can work with her other treatment options.  Told her if there is any new problem or throat or breathing problem let us know but I think GI would be the most appropriate way and she wants to do that Vince were helping arrange that near future

## 2020-05-27 NOTE — PATIENT INSTRUCTIONS
MyPlate from USDA    MyPlate is an outline of a general healthy diet based on the 2010 Dietary Guidelines for Americans, from the U.S. Department of Agriculture (USDA). It sets guidelines for how much food you should eat from each food group based on your age, sex, and level of physical activity.  What are tips for following MyPlate?  To follow MyPlate recommendations:  · Eat a wide variety of fruits and vegetables, grains, and protein foods.  · Serve smaller portions and eat less food throughout the day.  · Limit portion sizes to avoid overeating.  · Enjoy your food.  · Get at least 150 minutes of exercise every week. This is about 30 minutes each day, 5 or more days per week.  It can be difficult to have every meal look like MyPlate. Think about MyPlate as eating guidelines for an entire day, rather than each individual meal.  Fruits and vegetables  · Make half of your plate fruits and vegetables.  · Eat many different colors of fruits and vegetables each day.  · For a 2,000 calorie daily food plan, eat:  ? 2½ cups of vegetables every day.  ? 2 cups of fruit every day.  · 1 cup is equal to:  ? 1 cup raw or cooked vegetables.  ? 1 cup raw fruit.  ? 1 medium-sized orange, apple, or banana.  ? 1 cup 100% fruit or vegetable juice.  ? 2 cups raw leafy greens, such as lettuce, spinach, or kale.  ? ½ cup dried fruit.  Grains  · One fourth of your plate should be grains.  · Make at least half of the grains you eat each day whole grains.  · For a 2,000 calorie daily food plan, eat 6 oz of grains every day.  · 1 oz is equal to:  ? 1 slice bread.  ? 1 cup cereal.  ? ½ cup cooked rice, cereal, or pasta.  Protein  · One fourth of your plate should be protein.  · Eat a wide variety of protein foods, including meat, poultry, fish, eggs, beans, nuts, and tofu.  · For a 2,000 calorie daily food plan, eat 5½ oz of protein every day.  · 1 oz is equal to:  ? 1 oz meat, poultry, or fish.  ? ¼ cup cooked beans.  ? 1 egg.  ? ½ oz nuts  or seeds.  ? 1 Tbsp peanut butter.  Dairy  · Drink fat-free or low-fat (1%) milk.  · Eat or drink dairy as a side to meals.  · For a 2,000 calorie daily food plan, eat or drink 3 cups of dairy every day.  · 1 cup is equal to:  ? 1 cup milk, yogurt, cottage cheese, or soy milk (soy beverage).  ? 2 oz processed cheese.  ? 1½ oz natural cheese.  Fats, oils, salt, and sugars  · Only small amounts of oils are recommended.  · Avoid foods that are high in calories and low in nutritional value (empty calories), like foods high in fat or added sugars.  · Choose foods that are low in salt (sodium). Choose foods that have less than 140 milligrams (mg) of sodium per serving.  · Drink water instead of sugary drinks. Drink enough water each day to keep your urine pale yellow.  Where to find support  · Work with your health care provider or a nutrition specialist (dietitian) to develop a customized eating plan that is right for you.  · Download an kate (mobile application) to help you track your daily food intake.  Where to find more information  · Go to ChooseMyPlate.gov for more information.  · Learn more and log your daily food intake according to MyPlate using USDA's SuperTracker: www.RecentPoker.comer.usda.gov  Summary  · MyPlate is a general guideline for healthy eating from the USDA. It is based on the 2010 Dietary Guidelines for Americans.  · In general, fruits and vegetables should take up ½ of your plate, grains should take up ¼ of your plate, and protein should take up ¼ of your plate.  This information is not intended to replace advice given to you by your health care provider. Make sure you discuss any questions you have with your health care provider.  Document Released: 01/06/2009 Document Revised: 01/19/2019 Document Reviewed: 03/19/2018  Elsevier Patient Education © 2020 [employer].

## 2020-06-01 RX ORDER — SUCRALFATE 1 G/1
TABLET ORAL
Qty: 30 TABLET | Refills: 0 | Status: SHIPPED | OUTPATIENT
Start: 2020-06-01 | End: 2020-06-25

## 2020-06-01 NOTE — TELEPHONE ENCOUNTER
06/01/2020, 1146 - Patient's prior clinical appointment with Dr. Adin Winters M.D./ENT Wednesday, May 27, 2020.  Patient does not have a future clinical appointment scheduled.  Patient has referral to Gastroenterology.  Per patient request, referral to be in [place], [place].

## 2020-06-25 RX ORDER — SUCRALFATE 1 G/1
TABLET ORAL
Qty: 30 TABLET | Refills: 0 | Status: SHIPPED | OUTPATIENT
Start: 2020-06-25 | End: 2020-07-21

## 2020-06-25 NOTE — TELEPHONE ENCOUNTER
06/25/2020, 1019 - Verbal approval obtained per Dr. Adin Winters M.D./ENT to provide patient with one additional prescription medication renewal for Sucralfate 1 GM Tablets, 1 tablet by mouth at bedtime, as patient clinical appointment with Monty Stringer MS, PA-C with [facility] Gastroenterology Department is not scheduled until Tuesday, July 28, 2020 at 3:10 P.M. at [facility], [place], [place].

## 2020-07-02 DIAGNOSIS — Z12.31 ENCOUNTER FOR SCREENING MAMMOGRAM FOR MALIGNANT NEOPLASM OF BREAST: ICD-10-CM

## 2020-07-17 RX ORDER — OMEPRAZOLE 40 MG/1
CAPSULE, DELAYED RELEASE ORAL
Qty: 30 CAPSULE | Refills: 0 | Status: SHIPPED | OUTPATIENT
Start: 2020-07-17 | End: 2020-08-10

## 2020-07-21 RX ORDER — SUCRALFATE 1 G/1
TABLET ORAL
Qty: 30 TABLET | Refills: 0 | Status: SHIPPED | OUTPATIENT
Start: 2020-07-21 | End: 2020-08-11

## 2020-07-30 RX ORDER — TRAZODONE HYDROCHLORIDE 50 MG/1
50 TABLET ORAL NIGHTLY
Qty: 90 TABLET | Refills: 3 | Status: SHIPPED | OUTPATIENT
Start: 2020-07-30 | End: 2020-08-03 | Stop reason: SDUPTHER

## 2020-08-03 RX ORDER — TRAZODONE HYDROCHLORIDE 50 MG/1
50 TABLET ORAL NIGHTLY
Qty: 90 TABLET | Refills: 3 | Status: SHIPPED | OUTPATIENT
Start: 2020-08-03 | End: 2021-08-05 | Stop reason: SDUPTHER

## 2020-08-10 RX ORDER — OMEPRAZOLE 40 MG/1
CAPSULE, DELAYED RELEASE ORAL
Qty: 30 CAPSULE | Refills: 0 | Status: SHIPPED | OUTPATIENT
Start: 2020-08-10 | End: 2020-10-02 | Stop reason: SDUPTHER

## 2020-08-10 NOTE — TELEPHONE ENCOUNTER
08/10/2020, 0933 - Patient prior clinical appointment with Dr. Winters 05/27/2020.  Patient referred per Dr. Winters to Monty Stringer MS, PA-C, with [facility], Gastroenterology Department, with patient's clinical appointment scheduled Tuesday, August 25, 2020 at 10:50 P.M. regarding Laryngopharyngeal Reflux.

## 2020-08-11 RX ORDER — SUCRALFATE 1 G/1
TABLET ORAL
Qty: 30 TABLET | Refills: 0 | Status: SHIPPED | OUTPATIENT
Start: 2020-08-11 | End: 2020-09-03

## 2020-08-11 NOTE — TELEPHONE ENCOUNTER
08/11/2020, 1254 - Patient prior clinical appointment with Dr. Winters 05/27/2020.  Dr. Winters referred patient to Monty Stringer MS, PA-C, with [facility], Gastroenterology Department.  Patient's clinical appointment scheduled Tuesday, August 25, 2020 at 10:50 P.M. regarding Laryngopharyngeal Reflux.

## 2020-09-01 ENCOUNTER — OFFICE VISIT (OUTPATIENT)
Dept: FAMILY MEDICINE CLINIC | Facility: CLINIC | Age: 70
End: 2020-09-01

## 2020-09-01 VITALS
SYSTOLIC BLOOD PRESSURE: 128 MMHG | WEIGHT: 233 LBS | HEIGHT: 65 IN | TEMPERATURE: 98.1 F | BODY MASS INDEX: 38.82 KG/M2 | OXYGEN SATURATION: 97 % | RESPIRATION RATE: 18 BRPM | DIASTOLIC BLOOD PRESSURE: 79 MMHG | HEART RATE: 98 BPM

## 2020-09-01 DIAGNOSIS — Z11.59 NEED FOR HEPATITIS C SCREENING TEST: Primary | ICD-10-CM

## 2020-09-01 DIAGNOSIS — Z00.00 MEDICARE ANNUAL WELLNESS VISIT, SUBSEQUENT: ICD-10-CM

## 2020-09-01 PROCEDURE — G0439 PPPS, SUBSEQ VISIT: HCPCS | Performed by: NURSE PRACTITIONER

## 2020-09-01 NOTE — PROGRESS NOTES
The ABCs of the Annual Wellness Visit  Subsequent Medicare Wellness Visit    Chief Complaint   Patient presents with   • Medicare Wellness-subsequent       Subjective   History of Present Illness:  Sofía Clarke is a 70 y.o. female who presents for a Subsequent Medicare Wellness Visit.    HEALTH RISK ASSESSMENT    Recent Hospitalizations:  No hospitalization(s) within the last year.    Current Medical Providers:  Patient Care Team:  Merle Parker APRN as PCP - General (Family Medicine)  Adin Winters MD as PCP - Claims Attributed    Smoking Status:  Social History     Tobacco Use   Smoking Status Never Smoker   Smokeless Tobacco Never Used       Alcohol Consumption:  Social History     Substance and Sexual Activity   Alcohol Use Never   • Frequency: Never       Depression Screen:   PHQ-2/PHQ-9 Depression Screening 9/1/2020   Little interest or pleasure in doing things 0   Feeling down, depressed, or hopeless 0   Total Score 0       Fall Risk Screen:  FRANCES Fall Risk Assessment was completed, and patient is at HIGH risk for falls. Assessment completed on:9/1/2020    Health Habits and Functional and Cognitive Screening:  Functional & Cognitive Status 9/1/2020   Do you have difficulty preparing food and eating? No   Do you have difficulty bathing yourself, getting dressed or grooming yourself? No   Do you have difficulty using the toilet? No   Do you have difficulty moving around from place to place? No   Do you have trouble with steps or getting out of a bed or a chair? No   Current Diet Well Balanced Diet   Dental Exam Not up to date   Eye Exam Not up to date   Exercise (times per week) 0 times per week   Current Exercise Activities Include None   Do you need help using the phone?  No   Are you deaf or do you have serious difficulty hearing?  No   Do you need help with transportation? No   Do you need help shopping? No   Do you need help preparing meals?  No   Do you need help with housework?  No    Do you need help with laundry? No   Do you need help taking your medications? No   Do you need help managing money? No   Do you ever drive or ride in a car without wearing a seat belt? No   Have you felt unusual stress, anger or loneliness in the last month? No   Who do you live with? Child   If you need help, do you have trouble finding someone available to you? No   Have you been bothered in the last four weeks by sexual problems? No   Do you have difficulty concentrating, remembering or making decisions? No         Does the patient have evidence of cognitive impairment? No    Asprin use counseling:Start ASA 81 mg daily     Age-appropriate Screening Schedule:  Refer to the list below for future screening recommendations based on patient's age, sex and/or medical conditions. Orders for these recommended tests are listed in the plan section. The patient has been provided with a written plan.    Health Maintenance   Topic Date Due   • INFLUENZA VACCINE  09/01/2020 (Originally 8/1/2020)   • TDAP/TD VACCINES (1 - Tdap) 09/01/2020 (Originally 5/9/1961)   • ZOSTER VACCINE (1 of 2) 09/01/2020 (Originally 5/9/2001)   • MAMMOGRAM  06/30/2022   • COLONOSCOPY  02/15/2029          The following portions of the patient's history were reviewed and updated as appropriate: allergies, current medications, past family history, past medical history, past social history, past surgical history and problem list.    Outpatient Medications Prior to Visit   Medication Sig Dispense Refill   • Cyanocobalamin (B-12) 1000 MCG capsule Take 1 tablet by mouth Daily.     • DULoxetine HCl 40 MG capsule delayed-release particles Take 1 tablet by mouth Daily.     • ferrous sulfate (IRON SUPPLEMENT) 325 (65 FE) MG tablet Take 1 tablet by mouth Daily With Breakfast. 30 tablet 5   • folic acid (FOLVITE) 400 MCG tablet Take 400 mcg by mouth Daily.     • gabapentin (NEURONTIN) 300 MG capsule TAKE ONE CAPSULE BY MOUTH EVERY NIGHT 30 capsule 3   •  "ibuprofen (ADVIL,MOTRIN) 200 MG tablet Take 200 mg by mouth Every 6 (Six) Hours As Needed for Mild Pain .     • omeprazole (priLOSEC) 40 MG capsule TAKE 1 CAPSULE EVERY MORNING ON AN EMPTY STOMACH 30 capsule 0   • sucralfate (CARAFATE) 1 g tablet TAKE 1 TABLET AT BEDTIME 30 tablet 0   • tiZANidine (ZANAFLEX) 4 MG tablet Take 1 tablet by mouth Every 8 (Eight) Hours As Needed for Muscle Spasms.     • traZODone (DESYREL) 50 MG tablet Take 1 tablet by mouth Every Night. 90 tablet 3   • vitamin B-6 (PYRIDOXINE) 50 MG tablet Take 25 mg by mouth Daily.       No facility-administered medications prior to visit.        Patient Active Problem List   Diagnosis   • Morbidly obese (CMS/[facility])       Advanced Care Planning:  ACP discussion was held with the patient during this visit. Patient does not have an advance directive, information provided.    Review of Systems    Compared to one year ago, the patient feels her physical health is worse.  Compared to one year ago, the patient feels her mental health is worse.    Reviewed chart for potential of high risk medication in the elderly: yes  Reviewed chart for potential of harmful drug interactions in the elderly:yes    Objective         Vitals:    09/01/20 1125   BP: 128/79   BP Location: Right arm   Patient Position: Sitting   Cuff Size: Adult   Pulse: 98   Resp: 18   Temp: 98.1 °F (36.7 °C)   TempSrc: Temporal   SpO2: 97%   Weight: 106 kg (233 lb)   Height: 165.1 cm (65\")   PainSc: 0-No pain       Body mass index is 38.77 kg/m².  Discussed the patient's BMI with her. The BMI is above average; no BMI management plan is appropriate..    Physical Exam          Assessment/Plan   Medicare Risks and Personalized Health Plan  CMS Preventative Services Quick Reference  Advance Directive Discussion    The above risks/problems have been discussed with the patient.  Pertinent information has been shared with the patient in the After Visit Summary.  Follow up plans and orders are seen below " in the Assessment/Plan Section.    Diagnoses and all orders for this visit:    1. Need for hepatitis C screening test (Primary)  -     Hepatitis panel, acute; Future    2. Medicare annual wellness visit, subsequent      Follow Up:  Return for Next scheduled follow up.     An After Visit Summary and PPPS were given to the patient.

## 2020-09-03 RX ORDER — SUCRALFATE 1 G/1
TABLET ORAL
Qty: 30 TABLET | Refills: 0 | Status: SHIPPED | OUTPATIENT
Start: 2020-09-03 | End: 2020-10-02 | Stop reason: SDUPTHER

## 2020-09-08 DIAGNOSIS — D50.9 IRON DEFICIENCY ANEMIA, UNSPECIFIED IRON DEFICIENCY ANEMIA TYPE: ICD-10-CM

## 2020-09-08 RX ORDER — FERROUS SULFATE 325(65) MG
TABLET ORAL
Qty: 30 TABLET | Refills: 5 | Status: SHIPPED | OUTPATIENT
Start: 2020-09-08 | End: 2020-12-03 | Stop reason: SDUPTHER

## 2020-09-09 DIAGNOSIS — M79.7 FIBROMYALGIA: ICD-10-CM

## 2020-09-10 RX ORDER — GABAPENTIN 300 MG/1
CAPSULE ORAL
Qty: 30 CAPSULE | Refills: 3 | Status: SHIPPED | OUTPATIENT
Start: 2020-09-10 | End: 2020-12-03 | Stop reason: SDUPTHER

## 2020-09-22 ENCOUNTER — OFFICE VISIT (OUTPATIENT)
Dept: GASTROENTEROLOGY | Facility: CLINIC | Age: 70
End: 2020-09-22

## 2020-09-22 VITALS — BODY MASS INDEX: 39.02 KG/M2 | HEIGHT: 65 IN | WEIGHT: 234.2 LBS

## 2020-09-22 DIAGNOSIS — K21.9 GASTROESOPHAGEAL REFLUX DISEASE, ESOPHAGITIS PRESENCE NOT SPECIFIED: Primary | ICD-10-CM

## 2020-09-22 DIAGNOSIS — Z91.018 MULTIPLE FOOD ALLERGIES: ICD-10-CM

## 2020-09-22 DIAGNOSIS — R13.10 DYSPHAGIA, UNSPECIFIED TYPE: ICD-10-CM

## 2020-09-22 DIAGNOSIS — R11.0 NAUSEA: ICD-10-CM

## 2020-09-22 PROCEDURE — 99214 OFFICE O/P EST MOD 30 MIN: CPT | Performed by: PHYSICIAN ASSISTANT

## 2020-09-22 RX ORDER — DEXTROSE AND SODIUM CHLORIDE 5; .45 G/100ML; G/100ML
30 INJECTION, SOLUTION INTRAVENOUS CONTINUOUS PRN
Status: CANCELLED | OUTPATIENT
Start: 2020-10-13

## 2020-09-22 NOTE — PROGRESS NOTES
Chief Complaint   Patient presents with   • Pharyngoesophageal dysphagia       ENDO PROCEDURE ORDERED: EGD poss dilation, biopsies    Subjective    Sofía Clarke is a 70 y.o. female. she is being seen for consultation today at the request of Dr. Winters.    History of Present Illness    This 70-year-old retired female was sent for consultation for pharyngoesophageal dysphagia by Dr. Winters who saw the patient for reflux, globus sensation on 2020. She had undergone esophagram 2020 that showed GERD with moderate hiatal hernia. Laboratories 2020 showed negative KATHLEEN, RA. Iron studies were normal. Sedimentation rate was 8. CRP was 0.53.     Patient states she is on the Prilosec and Carafate. It has helped decrease her symptoms. She is trying to drink less acidic foods. She does have some nausea at times but no vomiting. She still has intermittent dysphagia both to solids and liquids. She states she has been told in the past she has a great number of food allergies but is not able to be more elaborate. She thinks her last EGD was in . She thinks her last colonoscopy was in [facility] in 2018. She was told she would need another colonoscopy in 5 years. She thinks she did have some polyps in the past. Bowels tend to be constipated but she is on iron. She has not noticed any color changes; sometimes they are loose. No blood. Her weight has been stable. Appetite is normal.     She currently denies tobacco, alcohol or illicit substance use. She has a history of fibromyalgia, colon polyps. She has had cardiac ablation for a V-tach. Family history of dementia, cancer, colon problems, hypertension, allergies. Father  at age 72 of lung cancer. Mother  with dementia at age 97, spouse at age 63 with colon cancer. Was  from  to . Two brothers, 1 sister, 1 child in good health.     ASSESSMENT/PLAN:  Patient with nausea, GERD symptoms with variable dysphagia. Esophagram showed a  hiatal hernia. Apparently, did not show significant dysmotility. I did recommend EGD. We will do biopsies, possible dilatation to further evaluate. We will try to get copies of her last endoscopy. She will continue regimen for now. We recommended to avoid gastric irritants. We will see her in followup after the above. Further pending clinical course and the results of the above.     Thank you very much, Dr. Winters, for this consultation and for allowing us to participate in the care of your patient. We will keep you informed.      The following portions of the patient's history were reviewed and updated as appropriate:   Past Medical History:   Diagnosis Date   • Anemia    • Hiatal hernia      Past Surgical History:   Procedure Laterality Date   • CARDIAC SURGERY  2000    cath ablation-vein   • COLONOSCOPY  2010/2018   • WISDOM TOOTH EXTRACTION       Family History   Problem Relation Age of Onset   • Emphysema Mother    • Dementia Mother    • Colon polyps Mother    • Diabetes Father    • Cancer Father    • No Known Problems Sister    • Hypertension Brother    • Diabetes Paternal Grandmother    • Cancer Paternal Grandfather    • Cancer Brother      OB History    No obstetric history on file.       Allergies   Allergen Reactions   • Aspirin GI Intolerance   • Codeine Nausea And Vomiting   • Lyrica [Pregabalin] Mental Status Change   • Penicillins GI Intolerance   • Latex Rash     Social History     Socioeconomic History   • Marital status:      Spouse name: Not on file   • Number of children: Not on file   • Years of education: Not on file   • Highest education level: Not on file   Tobacco Use   • Smoking status: Never Smoker   • Smokeless tobacco: Never Used   Substance and Sexual Activity   • Alcohol use: Never     Frequency: Never   • Drug use: Never   • Sexual activity: Defer     Current Medications:  Prior to Admission medications    Medication Sig Start Date End Date Taking? Authorizing Provider  "  Cyanocobalamin (B-12) 1000 MCG capsule Take 1 tablet by mouth Daily.   Yes Khoi Albright MD   DULoxetine HCl 40 MG capsule delayed-release particles Take 1 tablet by mouth Daily. 12/2/19  Yes Khoi Albright MD   FEROSUL 325 (65 Fe) MG tablet TAKE 1 TABLET BY MOUTH DAILY WITH BREAKFAST 9/8/20  Yes Merle Parker APRN   folic acid (FOLVITE) 400 MCG tablet Take 400 mcg by mouth Daily.   Yes Khoi Albright MD   gabapentin (NEURONTIN) 300 MG capsule TAKE 1 CAPSULE BY MOUTH EVERY NIGHT 9/10/20  Yes Merle Parker APRN   ibuprofen (ADVIL,MOTRIN) 200 MG tablet Take 200 mg by mouth Every 6 (Six) Hours As Needed for Mild Pain .   Yes Khoi Albright MD   omeprazole (priLOSEC) 40 MG capsule TAKE 1 CAPSULE EVERY MORNING ON AN EMPTY STOMACH 8/10/20  Yes Adin Winters MD   sucralfate (CARAFATE) 1 g tablet TAKE 1 TABLET AT BEDTIME 9/3/20  Yes Tim Medrano MD   tiZANidine (ZANAFLEX) 4 MG tablet Take 1 tablet by mouth Every 8 (Eight) Hours As Needed for Muscle Spasms. 12/2/19  Yes Khoi Albright MD   traZODone (DESYREL) 50 MG tablet Take 1 tablet by mouth Every Night. 8/3/20  Yes Merle Parker APRN   vitamin B-6 (PYRIDOXINE) 50 MG tablet Take 25 mg by mouth Daily.   Yes Khoi Albright MD     Review of Systems  Review of Systems   Constitutional: Negative for unexpected weight change.   HENT: Positive for trouble swallowing.    Respiratory: Positive for choking. Negative for cough.    Gastrointestinal: Positive for abdominal pain and diarrhea. Negative for anal bleeding, blood in stool, constipation and rectal pain.          Objective    Ht 165.1 cm (65\")   Wt 106 kg (234 lb 3.2 oz)   LMP  (LMP Unknown)   BMI 38.97 kg/m²   Physical Exam  Vitals signs and nursing note reviewed.   Constitutional:       General: She is not in acute distress.     Appearance: Normal appearance. She is well-developed. She is obese.   HENT:      Head: Normocephalic and atraumatic. "   Eyes:      Pupils: Pupils are equal, round, and reactive to light.   Neck:      Musculoskeletal: Normal range of motion.   Cardiovascular:      Rate and Rhythm: Normal rate and regular rhythm.      Heart sounds: Normal heart sounds.   Pulmonary:      Effort: Pulmonary effort is normal.      Breath sounds: Normal breath sounds.   Abdominal:      General: Bowel sounds are normal. There is no distension or abdominal bruit.      Palpations: Abdomen is soft. Abdomen is not rigid. There is no shifting dullness or mass.      Tenderness: There is abdominal tenderness. There is no guarding or rebound.      Hernia: No hernia is present. There is no hernia in the ventral area.      Comments: Mild obese   Musculoskeletal: Normal range of motion.   Skin:     General: Skin is warm and dry.   Neurological:      Mental Status: She is alert and oriented to person, place, and time.   Psychiatric:         Behavior: Behavior normal.         Thought Content: Thought content normal.         Judgment: Judgment normal.       Assessment/Plan      1. Gastroesophageal reflux disease, esophagitis presence not specified    2. Nausea    3. Dysphagia, unspecified type    4. Multiple food allergies    .   Sofía was seen today for pharyngoesophageal dysphagia.    Diagnoses and all orders for this visit:    Gastroesophageal reflux disease, esophagitis presence not specified  -     Case Request; Standing  -     Case Request    Nausea  -     Case Request; Standing  -     Case Request    Dysphagia, unspecified type  -     Case Request; Standing  -     Case Request    Multiple food allergies  -     Case Request; Standing  -     Case Request    Other orders  -     Follow Anesthesia Guidelines / Standing Orders; Future  -     Obtain Informed Consent; Future        Orders placed during this encounter include:  Orders Placed This Encounter   Procedures   • Follow Anesthesia Guidelines / Standing Orders     Standing Status:   Future   • Obtain Informed  Consent     Standing Status:   Future     Order Specific Question:   Informed Consent Given For     Answer:   ESOPHAGOGASTRODUODENOSCOPY WITH DILATATION       Medications prescribed:  No orders of the defined types were placed in this encounter.      Requested Prescriptions      No prescriptions requested or ordered in this encounter       Review and/or summary of lab tests, radiology, procedures, medications. Review and summary of old records and obtaining of history. The risks and benefits of my recommendations, as well as other treatment options were discussed with the patient today. Questions were answered.    Follow-up: Return in about 6 weeks (around 11/3/2020), or if symptoms worsen or fail to improve.     ESOPHAGOGASTRODUODENOSCOPY WITH possible DILATATION--bx (N/A)      This document has been electronically signed by Monty Stringer PA-C on September 28, 2020 18:07 CDT      Results for orders placed or performed during the hospital encounter of 05/19/20   SARS-CoV-2, ROSEMARIE ([facility]) - Swab, Nasopharynx    Specimen: Nasopharynx; Swab   Result Value Ref Range    SARS-CoV-2, ROSEMARIE Not Detected Not Detected   Results for orders placed or performed in visit on 03/12/20   Sedimentation rate, automated    Specimen: Blood   Result Value Ref Range    Sed Rate 8 0 - 20 mm/hr   Rheumatoid factor    Specimen: Blood   Result Value Ref Range    Rheumatoid Factor Quantitative <10.0 0.0 - 14.0 IU/mL   C-reactive protein    Specimen: Blood   Result Value Ref Range    C-Reactive Protein 0.53 (H) 0.00 - 0.50 mg/dL   KATHLEEN    Specimen: Blood   Result Value Ref Range    KATHLEEN Direct Negative Negative   Liquid-based Pap Smear, Screening    Specimen: Cervix; ThinPrep Vial   Result Value Ref Range    Case Report       Gynecologic Cytology Report                       Case: [id number]                                  Authorizing Provider:  Merle Parker APRN    Collected:           03/12/2020 04:12 PM          Ordering Location:      [facility]     Received:            03/12/2020 04:12 PM                                 GROUP [facility]                                                                 First Screen:          Sabrina Gomez                                                              Specimen:    Liquid-Based Pap, Screening, Cervix                                                        Interpretation Negative for intraepithelial lesion or malignancy      General Categorization Within normal limits     Specimen Adequacy Satisfactory for evaluation     Additional Information       Disclaimer: Cervical cytology is a screening test primarily for squamous cancer and its precursors and has associated false-negative and false-positive results.  Technologies such as liquid-based preparations may decrease but will not eliminate all false-negative results.  Follow-up of unexplained clinical signs and symptoms is recommended to minimize false-negative results. (The [place] System for Reporting Cervical Cytology: [place], 2015).     Results for orders placed or performed in visit on 01/27/20   Iron and TIBC    Specimen: Blood   Result Value Ref Range    Iron 102 37 - 145 mcg/dL    Iron Saturation 25 20 - 50 %    Transferrin 277 200 - 360 mg/dL    TIBC 413 298 - 536 mcg/dL   Results for orders placed or performed in visit on 12/12/19   CBC Auto Differential    Specimen: Blood   Result Value Ref Range    WBC 7.82 3.40 - 10.80 10*3/mm3    RBC 4.90 3.77 - 5.28 10*6/mm3    Hemoglobin 10.7 (L) 12.0 - 15.9 g/dL    Hematocrit 33.0 (L) 34.0 - 46.6 %    MCV 67.3 (L) 79.0 - 97.0 fL    MCH 21.8 (L) 26.6 - 33.0 pg    MCHC 32.4 31.5 - 35.7 g/dL    RDW 15.7 (H) 12.3 - 15.4 %    RDW-SD 36.7 (L) 37.0 - 54.0 fl    MPV 10.5 6.0 - 12.0 fL    Platelets 305 140 - 450 10*3/mm3   Iron and TIBC    Specimen: Blood   Result Value Ref Range    Iron 128 37 - 145  mcg/dL    Iron Saturation 30 20 - 50 %    Transferrin 283 200 - 360 mg/dL    TIBC 422 298 - 536 mcg/dL   Manual Differential    Specimen: Blood   Result Value Ref Range    Neutrophil % 50.5 42.7 - 76.0 %    Lymphocyte % 40.4 19.6 - 45.3 %    Monocyte % 9.1 5.0 - 12.0 %    Neutrophils Absolute 3.95 1.70 - 7.00 10*3/mm3    Lymphocytes Absolute 3.16 (H) 0.70 - 3.10 10*3/mm3    Monocytes Absolute 0.71 0.10 - 0.90 10*3/mm3    nRBC 2.0 (H) 0.0 - 0.2 /100 WBC    Anisocytosis Mod/2+ None Seen    Microcytes Mod/2+ None Seen    Poikilocytes Slight/1+ None Seen    Target Cells Slight/1+ None Seen    WBC Morphology Normal Normal    Platelet Morphology Normal Normal   TSH    Specimen: Blood   Result Value Ref Range    TSH 2.070 0.270 - 4.200 uIU/mL   Vitamin B12    Specimen: Blood   Result Value Ref Range    Vitamin B-12 >2,000 (H) 211 - 946 pg/mL   Comprehensive metabolic panel    Specimen: Blood   Result Value Ref Range    Glucose 83 65 - 99 mg/dL    BUN 12 8 - 23 mg/dL    Creatinine 0.96 0.57 - 1.00 mg/dL    Sodium 139 136 - 145 mmol/L    Potassium 4.3 3.5 - 5.2 mmol/L    Chloride 100 98 - 107 mmol/L    CO2 27.7 22.0 - 29.0 mmol/L    Calcium 9.6 8.6 - 10.5 mg/dL    Total Protein 7.5 6.0 - 8.5 g/dL    Albumin 4.40 3.50 - 5.20 g/dL    ALT (SGPT) 12 1 - 33 U/L    AST (SGOT) 11 1 - 32 U/L    Alkaline Phosphatase 87 39 - 117 U/L    Total Bilirubin 0.6 0.2 - 1.2 mg/dL    eGFR Non African Amer 58 (L) >60 mL/min/1.73    Globulin 3.1 gm/dL    A/G Ratio 1.4 g/dL    BUN/Creatinine Ratio 12.5 7.0 - 25.0    Anion Gap 11.3 5.0 - 15.0 mmol/L       Some portions of this note have been dictated using voice recognition software and may contain errors and/or omissions.

## 2020-09-22 NOTE — PATIENT INSTRUCTIONS

## 2020-09-28 RX ORDER — SUCRALFATE 1 G/1
TABLET ORAL
Qty: 30 TABLET | Refills: 0 | OUTPATIENT
Start: 2020-09-28

## 2020-10-02 RX ORDER — OMEPRAZOLE 40 MG/1
40 CAPSULE, DELAYED RELEASE ORAL DAILY
Qty: 30 CAPSULE | Refills: 0 | Status: SHIPPED | OUTPATIENT
Start: 2020-10-02 | End: 2020-11-06 | Stop reason: SDUPTHER

## 2020-10-02 RX ORDER — SUCRALFATE 1 G/1
1 TABLET ORAL
Qty: 30 TABLET | Refills: 0 | Status: SHIPPED | OUTPATIENT
Start: 2020-10-02 | End: 2021-01-12 | Stop reason: SDUPTHER

## 2020-10-10 ENCOUNTER — LAB (OUTPATIENT)
Dept: LAB | Facility: HOSPITAL | Age: 70
End: 2020-10-10

## 2020-10-10 DIAGNOSIS — Z01.818 PREOP TESTING: Primary | ICD-10-CM

## 2020-10-10 PROCEDURE — C9803 HOPD COVID-19 SPEC COLLECT: HCPCS

## 2020-10-10 PROCEDURE — U0003 INFECTIOUS AGENT DETECTION BY NUCLEIC ACID (DNA OR RNA); SEVERE ACUTE RESPIRATORY SYNDROME CORONAVIRUS 2 (SARS-COV-2) (CORONAVIRUS DISEASE [COVID-19]), AMPLIFIED PROBE TECHNIQUE, MAKING USE OF HIGH THROUGHPUT TECHNOLOGIES AS DESCRIBED BY CMS-2020-01-R: HCPCS

## 2020-10-11 LAB
COVID LABCORP PRIORITY: NORMAL
SARS-COV-2 RNA RESP QL NAA+PROBE: NOT DETECTED

## 2020-10-13 ENCOUNTER — HOSPITAL ENCOUNTER (OUTPATIENT)
Facility: HOSPITAL | Age: 70
Setting detail: HOSPITAL OUTPATIENT SURGERY
Discharge: HOME | End: 2020-10-13
Attending: INTERNAL MEDICINE | Admitting: INTERNAL MEDICINE

## 2020-10-13 ENCOUNTER — ANESTHESIA EVENT (OUTPATIENT)
Dept: GASTROENTEROLOGY | Facility: HOSPITAL | Age: 70
End: 2020-10-13

## 2020-10-13 ENCOUNTER — ANESTHESIA (OUTPATIENT)
Dept: GASTROENTEROLOGY | Facility: HOSPITAL | Age: 70
End: 2020-10-13

## 2020-10-13 VITALS
RESPIRATION RATE: 18 BRPM | SYSTOLIC BLOOD PRESSURE: 137 MMHG | HEIGHT: 65 IN | TEMPERATURE: 97 F | BODY MASS INDEX: 38.49 KG/M2 | OXYGEN SATURATION: 94 % | WEIGHT: 231 LBS | DIASTOLIC BLOOD PRESSURE: 84 MMHG | HEART RATE: 80 BPM

## 2020-10-13 DIAGNOSIS — R13.10 DYSPHAGIA, UNSPECIFIED TYPE: ICD-10-CM

## 2020-10-13 DIAGNOSIS — K21.9 GASTROESOPHAGEAL REFLUX DISEASE: ICD-10-CM

## 2020-10-13 DIAGNOSIS — K21.9 GASTROESOPHAGEAL REFLUX DISEASE, ESOPHAGITIS PRESENCE NOT SPECIFIED: ICD-10-CM

## 2020-10-13 DIAGNOSIS — Z91.018 MULTIPLE FOOD ALLERGIES: ICD-10-CM

## 2020-10-13 DIAGNOSIS — R11.0 NAUSEA: ICD-10-CM

## 2020-10-13 PROCEDURE — 25010000002 PROPOFOL 10 MG/ML EMULSION: Performed by: NURSE ANESTHETIST, CERTIFIED REGISTERED

## 2020-10-13 PROCEDURE — 43248 EGD GUIDE WIRE INSERTION: CPT | Performed by: INTERNAL MEDICINE

## 2020-10-13 PROCEDURE — 88305 TISSUE EXAM BY PATHOLOGIST: CPT

## 2020-10-13 PROCEDURE — 43239 EGD BIOPSY SINGLE/MULTIPLE: CPT | Performed by: INTERNAL MEDICINE

## 2020-10-13 RX ORDER — LIDOCAINE HYDROCHLORIDE 20 MG/ML
INJECTION, SOLUTION EPIDURAL; INFILTRATION; INTRACAUDAL; PERINEURAL AS NEEDED
Status: DISCONTINUED | OUTPATIENT
Start: 2020-10-13 | End: 2020-10-13 | Stop reason: SURG

## 2020-10-13 RX ORDER — DEXTROSE AND SODIUM CHLORIDE 5; .45 G/100ML; G/100ML
30 INJECTION, SOLUTION INTRAVENOUS CONTINUOUS PRN
Status: DISCONTINUED | OUTPATIENT
Start: 2020-10-13 | End: 2020-10-13 | Stop reason: HOSPADM

## 2020-10-13 RX ORDER — PROPOFOL 10 MG/ML
VIAL (ML) INTRAVENOUS AS NEEDED
Status: DISCONTINUED | OUTPATIENT
Start: 2020-10-13 | End: 2020-10-13 | Stop reason: SURG

## 2020-10-13 RX ADMIN — PROPOFOL 20 MG: 10 INJECTION, EMULSION INTRAVENOUS at 13:35

## 2020-10-13 RX ADMIN — PROPOFOL 20 MG: 10 INJECTION, EMULSION INTRAVENOUS at 13:36

## 2020-10-13 RX ADMIN — PROPOFOL 80 MG: 10 INJECTION, EMULSION INTRAVENOUS at 13:34

## 2020-10-13 RX ADMIN — LIDOCAINE HYDROCHLORIDE 100 MG: 20 INJECTION, SOLUTION EPIDURAL; INFILTRATION; INTRACAUDAL; PERINEURAL at 13:34

## 2020-10-13 RX ADMIN — PROPOFOL 20 MG: 10 INJECTION, EMULSION INTRAVENOUS at 13:37

## 2020-10-13 RX ADMIN — DEXTROSE AND SODIUM CHLORIDE 30 ML/HR: 5; 450 INJECTION, SOLUTION INTRAVENOUS at 12:46

## 2020-10-13 NOTE — ANESTHESIA POSTPROCEDURE EVALUATION
Patient: Sofía Clarke    Procedure Summary     Date: 10/13/20 Room / Location: [facility] ENDOSCOPY 2 / [facility] ENDOSCOPY    Anesthesia Start: 1328 Anesthesia Stop: 1340    Procedure: ESOPHAGOGASTRODUODENOSCOPY WITH possible DILATATION--bx (N/A ) Diagnosis:       Gastroesophageal reflux disease, esophagitis presence not specified      Nausea      Dysphagia, unspecified type      Multiple food allergies      (Gastroesophageal reflux disease, esophagitis presence not specified [K21.9])      (Nausea [R11.0])      (Dysphagia, unspecified type [R13.10])      (Multiple food allergies [Z91.018])    Surgeon: Daniel Michael MD Provider: Tristan Mota CRNA    Anesthesia Type: MAC ASA Status: 3          Anesthesia Type: MAC    Vitals  No vitals data found for the desired time range.          Post Anesthesia Care and Evaluation    Patient location during evaluation: bedside  Patient participation: waiting for patient participation  Level of consciousness: responsive to physical stimuli  Pain management: adequate  Airway patency: patent  Anesthetic complications: No anesthetic complications  PONV Status: none  Cardiovascular status: acceptable  Respiratory status: acceptable  Hydration status: acceptable

## 2020-10-13 NOTE — ANESTHESIA PREPROCEDURE EVALUATION
Anesthesia Evaluation     Patient summary reviewed and Nursing notes reviewed   NPO Solid Status: > 8 hours  NPO Liquid Status: > 8 hours           Airway   Mallampati: III  TM distance: >3 FB  Neck ROM: full  Possible difficult intubation  Dental - normal exam     Pulmonary    Cardiovascular         Neuro/Psych  GI/Hepatic/Renal/Endo    (+) obesity, morbid obesity, hiatal hernia, GERD,      Musculoskeletal     Abdominal    Substance History      OB/GYN          Other                        Anesthesia Plan    ASA 3     MAC     intravenous induction     Anesthetic plan, all risks, benefits, and alternatives have been provided, discussed and informed consent has been obtained with: patient.    Plan discussed with CRNA.

## 2020-10-14 NOTE — TELEPHONE ENCOUNTER
"PATIENT CALLED AND STATED THAT HER PHARMACY WAS UNABLE TO FILL THE MEDICATIONS LISTED DUE TO THEM BEING \"BLOCKED\" PLEASE ADVISE.    [phone]    DULoxetine HCl 40 MG capsule delayed-release particles    tiZANidine (ZANAFLEX) 4 MG tablet    [facility] - [facility] [address] - [phone]  - [phone] FX  [phone]  "

## 2020-10-15 RX ORDER — TIZANIDINE 4 MG/1
4 TABLET ORAL EVERY 8 HOURS PRN
Qty: 90 TABLET | Refills: 3 | Status: SHIPPED | OUTPATIENT
Start: 2020-10-15 | End: 2020-10-16 | Stop reason: SDUPTHER

## 2020-10-15 RX ORDER — DULOXETINE 40 MG/1
40 CAPSULE, DELAYED RELEASE ORAL DAILY
Qty: 30 CAPSULE | Refills: 3 | Status: SHIPPED | OUTPATIENT
Start: 2020-10-15 | End: 2020-10-16 | Stop reason: SDUPTHER

## 2020-10-16 LAB
LAB AP CASE REPORT: NORMAL
PATH REPORT.FINAL DX SPEC: NORMAL

## 2020-10-16 RX ORDER — DULOXETINE 40 MG/1
40 CAPSULE, DELAYED RELEASE ORAL DAILY
Qty: 30 CAPSULE | Refills: 3 | Status: SHIPPED | OUTPATIENT
Start: 2020-10-16 | End: 2020-12-03

## 2020-10-16 RX ORDER — TIZANIDINE 4 MG/1
4 TABLET ORAL EVERY 8 HOURS PRN
Qty: 90 TABLET | Refills: 3 | Status: SHIPPED | OUTPATIENT
Start: 2020-10-16 | End: 2022-01-24 | Stop reason: SDUPTHER

## 2020-11-03 ENCOUNTER — OFFICE VISIT (OUTPATIENT)
Dept: GASTROENTEROLOGY | Facility: CLINIC | Age: 70
End: 2020-11-03

## 2020-11-03 VITALS
WEIGHT: 237.2 LBS | DIASTOLIC BLOOD PRESSURE: 83 MMHG | BODY MASS INDEX: 39.52 KG/M2 | HEART RATE: 95 BPM | HEIGHT: 65 IN | SYSTOLIC BLOOD PRESSURE: 142 MMHG

## 2020-11-03 DIAGNOSIS — K21.00 GASTROESOPHAGEAL REFLUX DISEASE WITH ESOPHAGITIS WITHOUT HEMORRHAGE: ICD-10-CM

## 2020-11-03 DIAGNOSIS — K22.2 PEPTIC STRICTURE OF ESOPHAGUS: Primary | ICD-10-CM

## 2020-11-03 DIAGNOSIS — R13.10 DYSPHAGIA, UNSPECIFIED TYPE: ICD-10-CM

## 2020-11-03 DIAGNOSIS — Z91.018 MULTIPLE FOOD ALLERGIES: ICD-10-CM

## 2020-11-03 PROCEDURE — 99213 OFFICE O/P EST LOW 20 MIN: CPT | Performed by: PHYSICIAN ASSISTANT

## 2020-11-03 NOTE — PROGRESS NOTES
Chief Complaint   Patient presents with   • Heartburn   • Nausea   • Difficulty Swallowing       ENDO PROCEDURE ORDERED:    Subjective    Sofía Clarke is a 70 y.o. female. she is here today for follow-up.    History of Present Illness    The patient was seen on recheck of her GERD, dysphagia, nausea.  Last seen 09/22/2020.  Patient underwent EGD on 10/13/2020 that showed esophageal stricture dilated to a 54-Vietnamese with Savary dilator, also showed significant esophagitis/gastritis.  Distal esophageal biopsy was benign.  Antral biopsy showed reactive gastropathy.    Patient states her dysphagia is doing better.  GERD is doing well on Prilosec and Carafate.  She denies nausea or vomiting.  Bowels are moving without blood or mucus.  Weight is up 3 pounds since last visit.  She forgot to bring a copy of her colonoscopy from 2018.    ASSESSMENT/PLAN:  Patient with recurrent peptic stricture with dysphagia, improved post dilatation.  Encouraged to avoid gastric irritants.  Recommended dietary modification and weight loss.  Refilled her Prilosec.  I suggested a trial of probiotics to help with her other complaints.  We will plan follow up in 3-6 months, sooner if needed.  Further pending results of above.  She was encouraged to bring a copy of her last colonoscopy.      The following portions of the patient's history were reviewed and updated as appropriate:   Past Medical History:   Diagnosis Date   • Anemia    • Colon polyps    • GERD (gastroesophageal reflux disease)    • Hiatal hernia    • Irritable bowel syndrome      Past Surgical History:   Procedure Laterality Date   • CARDIAC ABLATION     • CARDIAC CATHETERIZATION     • CARDIAC SURGERY  2000    cath ablation-vein   • COLONOSCOPY  2010/2018   • ENDOSCOPY N/A 10/13/2020    Procedure: ESOPHAGOGASTRODUODENOSCOPY WITH possible DILATATION--bx;  Surgeon: Daniel Michael MD;  Location: [facility] ENDOSCOPY;  Service: Gastroenterology;  Laterality: N/A;   • WISDOM  TOOTH EXTRACTION       Family History   Problem Relation Age of Onset   • Emphysema Mother    • Dementia Mother    • Colon polyps Mother    • Diabetes Father    • Cancer Father    • No Known Problems Sister    • Hypertension Brother    • Diabetes Paternal Grandmother    • Cancer Paternal Grandfather    • Cancer Brother      OB History    No obstetric history on file.       Allergies   Allergen Reactions   • Aspirin GI Intolerance   • Codeine Nausea And Vomiting   • Lyrica [Pregabalin] Mental Status Change   • Penicillins GI Intolerance   • Latex Rash     Social History     Socioeconomic History   • Marital status:      Spouse name: Not on file   • Number of children: Not on file   • Years of education: Not on file   • Highest education level: Not on file   Tobacco Use   • Smoking status: Never Smoker   • Smokeless tobacco: Never Used   Substance and Sexual Activity   • Alcohol use: Never     Frequency: Never   • Drug use: Never   • Sexual activity: Defer     Current Medications:  Prior to Admission medications    Medication Sig Start Date End Date Taking? Authorizing Provider   Cyanocobalamin (B-12) 1000 MCG capsule Take 1 tablet by mouth Daily.   Yes Khoi Albright MD   DULoxetine HCl 40 MG capsule delayed-release particles Take 1 capsule by mouth Daily. 10/16/20  Yes Merle Parker APRN   FEROSUL 325 (65 Fe) MG tablet TAKE 1 TABLET BY MOUTH DAILY WITH BREAKFAST 9/8/20  Yes Merle Parker APRN   folic acid (FOLVITE) 400 MCG tablet Take 400 mcg by mouth Daily.   Yes ProviderKhoi MD   gabapentin (NEURONTIN) 300 MG capsule TAKE 1 CAPSULE BY MOUTH EVERY NIGHT 9/10/20  Yes Merle Parker APRN   ibuprofen (ADVIL,MOTRIN) 200 MG tablet Take 200 mg by mouth Every 6 (Six) Hours As Needed for Mild Pain .   Yes ProviderKhoi MD   omeprazole (priLOSEC) 40 MG capsule Take 1 capsule by mouth Daily. 10/2/20  Yes Monty Stringer PA-C   sucralfate (CARAFATE) 1 g tablet Take 1 tablet by  "mouth every night at bedtime. 10/2/20  Yes Monty Stringer, REX   tiZANidine (ZANAFLEX) 4 MG tablet Take 1 tablet by mouth Every 8 (Eight) Hours As Needed for Muscle Spasms. 10/16/20  Yes Merle Parker APRN   traZODone (DESYREL) 50 MG tablet Take 1 tablet by mouth Every Night. 8/3/20  Yes Merle Parker APRN   vitamin B-6 (PYRIDOXINE) 50 MG tablet Take 25 mg by mouth Daily.   Yes Provider, MD Khoi     Review of Systems  Review of Systems       Objective    /83   Pulse 95   Ht 165.1 cm (65\")   Wt 108 kg (237 lb 3.2 oz)   LMP  (LMP Unknown)   BMI 39.47 kg/m²   Physical Exam  Vitals signs and nursing note reviewed.   Constitutional:       General: She is not in acute distress.     Appearance: She is well-developed.   HENT:      Head: Normocephalic and atraumatic.   Eyes:      Pupils: Pupils are equal, round, and reactive to light.   Neck:      Musculoskeletal: Normal range of motion.   Cardiovascular:      Rate and Rhythm: Normal rate and regular rhythm.      Heart sounds: Normal heart sounds.   Pulmonary:      Effort: Pulmonary effort is normal.      Breath sounds: Normal breath sounds.   Abdominal:      General: Bowel sounds are normal. There is no distension or abdominal bruit.      Palpations: Abdomen is soft. Abdomen is not rigid. There is no shifting dullness or mass.      Tenderness: There is abdominal tenderness. There is no guarding or rebound.      Hernia: No hernia is present. There is no hernia in the ventral area.   Musculoskeletal: Normal range of motion.   Skin:     General: Skin is warm and dry.   Neurological:      Mental Status: She is alert and oriented to person, place, and time.   Psychiatric:         Behavior: Behavior normal.         Thought Content: Thought content normal.         Judgment: Judgment normal.       Assessment/Plan      1. Peptic stricture of esophagus    2. Dysphagia, unspecified type    3. Multiple food allergies    4. Gastroesophageal reflux disease " with esophagitis without hemorrhage    .   Diagnoses and all orders for this visit:    1. Peptic stricture of esophagus (Primary)    2. Dysphagia, unspecified type    3. Multiple food allergies    4. Gastroesophageal reflux disease with esophagitis without hemorrhage    Other orders  -     omeprazole (priLOSEC) 40 MG capsule; Take 1 capsule by mouth Daily.  Dispense: 30 capsule; Refill: 0        Orders placed during this encounter include:  No orders of the defined types were placed in this encounter.      Medications prescribed:  New Medications Ordered This Visit   Medications   • omeprazole (priLOSEC) 40 MG capsule     Sig: Take 1 capsule by mouth Daily.     Dispense:  30 capsule     Refill:  0       Requested Prescriptions     Signed Prescriptions Disp Refills   • omeprazole (priLOSEC) 40 MG capsule 30 capsule 0     Sig: Take 1 capsule by mouth Daily.       Review and/or summary of lab tests, radiology, procedures, medications. Review and summary of old records and obtaining of history. The risks and benefits of my recommendations, as well as other treatment options were discussed with the patient today. Questions were answered.    Follow-up: Return in about 6 months (around 5/3/2021), or if symptoms worsen or fail to improve.     * Surgery not found *      This document has been electronically signed by Monty Stringer PA-C on November 6, 2020 13:35 CST      Results for orders placed or performed during the hospital encounter of 10/13/20   Tissue Pathology Exam    Specimen: A: Gastric, Antrum; Tissue    B: Esophagus, Distal; Tissue   Result Value Ref Range    Case Report       Surgical Pathology Report                         Case: [id number]                                  Authorizing Provider:  Daniel Michael MD        Collected:           10/13/2020 01:41 PM          Ordering Location:     [facility]             Received:            10/14/2020 06:58 AM                                 [place]  [facility]                                                     Pathologist:           Demar Florian MD                                                           Specimens:   1) - Gastric, Antrum, antrum bx                                                                     2) - Esophagus, Distal, distal esophagus bx                                                Final Diagnosis       SEE SCANNED REPORT       Results for orders placed or performed in visit on 10/10/20   COVID [facility] Priority - Swab, Nasopharynx    Specimen: Nasopharynx; Swab   Result Value Ref Range    COVID [facility] PRIORITY Comment    COVID-19,[facility] ROUTINE, NP/OP SWAB IN TRANSPORT MEDIA OR ESWAB 72 HR TAT - Swab, Nasopharynx    Specimen: Nasopharynx; Swab   Result Value Ref Range    SARS-CoV-2, ROSEMARIE Not Detected Not Detected   Results for orders placed or performed during the hospital encounter of 05/19/20   SARS-CoV-2, ROSEMARIE ([facility]) - Swab, Nasopharynx    Specimen: Nasopharynx; Swab   Result Value Ref Range    SARS-CoV-2, ROSEMARIE Not Detected Not Detected   Results for orders placed or performed in visit on 03/12/20   Sedimentation rate, automated    Specimen: Blood   Result Value Ref Range    Sed Rate 8 0 - 20 mm/hr   Rheumatoid factor    Specimen: Blood   Result Value Ref Range    Rheumatoid Factor Quantitative <10.0 0.0 - 14.0 IU/mL   C-reactive protein    Specimen: Blood   Result Value Ref Range    C-Reactive Protein 0.53 (H) 0.00 - 0.50 mg/dL   KATHLEEN    Specimen: Blood   Result Value Ref Range    KATHLEEN Direct Negative Negative   Liquid-based Pap Smear, Screening    Specimen: Cervix; ThinPrep Vial   Result Value Ref Range    Case Report       Gynecologic Cytology Report                       Case: [id number]                                  Authorizing Provider:  Merle Parker APRN    Collected:           03/12/2020 04:12 PM          Ordering Location:     [facility]     Received:            03/12/2020 04:12 PM                                  [facility]                                                                               [place]                                                                 First Screen:          Sabrina Gomez                                                              Specimen:    Liquid-Based Pap, Screening, Cervix                                                        Interpretation Negative for intraepithelial lesion or malignancy      General Categorization Within normal limits     Specimen Adequacy Satisfactory for evaluation     Additional Information       Disclaimer: Cervical cytology is a screening test primarily for squamous cancer and its precursors and has associated false-negative and false-positive results.  Technologies such as liquid-based preparations may decrease but will not eliminate all false-negative results.  Follow-up of unexplained clinical signs and symptoms is recommended to minimize false-negative results. (The [place] System for Reporting Cervical Cytology: [place], 2015).     Results for orders placed or performed in visit on 01/27/20   Iron and TIBC    Specimen: Blood   Result Value Ref Range    Iron 102 37 - 145 mcg/dL    Iron Saturation 25 20 - 50 %    Transferrin 277 200 - 360 mg/dL    TIBC 413 298 - 536 mcg/dL   Results for orders placed or performed in visit on 12/12/19   CBC Auto Differential    Specimen: Blood   Result Value Ref Range    WBC 7.82 3.40 - 10.80 10*3/mm3    RBC 4.90 3.77 - 5.28 10*6/mm3    Hemoglobin 10.7 (L) 12.0 - 15.9 g/dL    Hematocrit 33.0 (L) 34.0 - 46.6 %    MCV 67.3 (L) 79.0 - 97.0 fL    MCH 21.8 (L) 26.6 - 33.0 pg    MCHC 32.4 31.5 - 35.7 g/dL    RDW 15.7 (H) 12.3 - 15.4 %    RDW-SD 36.7 (L) 37.0 - 54.0 fl    MPV 10.5 6.0 - 12.0 fL    Platelets 305 140 - 450 10*3/mm3   Iron and TIBC    Specimen: Blood   Result Value Ref Range    Iron 128 37 - 145 mcg/dL    Iron Saturation 30 20 - 50 %    Transferrin 283 200 - 360 mg/dL    TIBC 422  298 - 536 mcg/dL   Manual Differential    Specimen: Blood   Result Value Ref Range    Neutrophil % 50.5 42.7 - 76.0 %    Lymphocyte % 40.4 19.6 - 45.3 %    Monocyte % 9.1 5.0 - 12.0 %    Neutrophils Absolute 3.95 1.70 - 7.00 10*3/mm3    Lymphocytes Absolute 3.16 (H) 0.70 - 3.10 10*3/mm3    Monocytes Absolute 0.71 0.10 - 0.90 10*3/mm3    nRBC 2.0 (H) 0.0 - 0.2 /100 WBC    Anisocytosis Mod/2+ None Seen    Microcytes Mod/2+ None Seen    Poikilocytes Slight/1+ None Seen    Target Cells Slight/1+ None Seen    WBC Morphology Normal Normal    Platelet Morphology Normal Normal   TSH    Specimen: Blood   Result Value Ref Range    TSH 2.070 0.270 - 4.200 uIU/mL   Vitamin B12    Specimen: Blood   Result Value Ref Range    Vitamin B-12 >2,000 (H) 211 - 946 pg/mL   Comprehensive metabolic panel    Specimen: Blood   Result Value Ref Range    Glucose 83 65 - 99 mg/dL    BUN 12 8 - 23 mg/dL    Creatinine 0.96 0.57 - 1.00 mg/dL    Sodium 139 136 - 145 mmol/L    Potassium 4.3 3.5 - 5.2 mmol/L    Chloride 100 98 - 107 mmol/L    CO2 27.7 22.0 - 29.0 mmol/L    Calcium 9.6 8.6 - 10.5 mg/dL    Total Protein 7.5 6.0 - 8.5 g/dL    Albumin 4.40 3.50 - 5.20 g/dL    ALT (SGPT) 12 1 - 33 U/L    AST (SGOT) 11 1 - 32 U/L    Alkaline Phosphatase 87 39 - 117 U/L    Total Bilirubin 0.6 0.2 - 1.2 mg/dL    eGFR Non African Amer 58 (L) >60 mL/min/1.73    Globulin 3.1 gm/dL    A/G Ratio 1.4 g/dL    BUN/Creatinine Ratio 12.5 7.0 - 25.0    Anion Gap 11.3 5.0 - 15.0 mmol/L       Some portions of this note have been dictated using voice recognition software and may contain errors and/or omissions.

## 2020-11-03 NOTE — PATIENT INSTRUCTIONS
"BMI for Adults  What is BMI?  Body mass index (BMI) is a number that is calculated from a person's weight and height. BMI can help estimate how much of a person's weight is composed of fat. BMI does not measure body fat directly. Rather, it is an alternative to procedures that directly measure body fat, which can be difficult and expensive.  BMI can help identify people who may be at higher risk for certain medical problems.  What are BMI measurements used for?  BMI is used as a screening tool to identify possible weight problems. It helps determine whether a person is obese, overweight, a healthy weight, or underweight.  BMI is useful for:  · Identifying a weight problem that may be related to a medical condition or may increase the risk for medical problems.  · Promoting changes, such as changes in diet and exercise, to help reach a healthy weight. BMI screening can be repeated to see if these changes are working.  How is BMI calculated?  BMI involves measuring your weight in relation to your height. Both height and weight are measured, and the BMI is calculated from those numbers. This can be done either in English ([place] or metric measurements. Note that charts and online BMI calculators are available to help you find your BMI quickly and easily without having to do these calculations yourself.  To calculate your BMI in English ([place] measurements:    1. Measure your weight in pounds (lb).  2. Multiply the number of pounds by 703.  ? For example, for a person who weighs 180 lb, multiply that number by 703, which equals 126,540.  3. Measure your height in inches. Then multiply that number by itself to get a measurement called \"inches squared.\"  ? For example, for a person who is 70 inches tall, the \"inches squared\" measurement is 70 inches x 70 inches, which equals 4,900 inches squared.  4. Divide the total from step 2 (number of lb x 703) by the total from step 3 (inches squared): 126,540 ÷ 4,900 = 25.8. This is " "your BMI.  To calculate your BMI in metric measurements:  1. Measure your weight in kilograms (kg).  2. Measure your height in meters (m). Then multiply that number by itself to get a measurement called \"meters squared.\"  ? For example, for a person who is 1.75 m tall, the \"meters squared\" measurement is 1.75 m x 1.75 m, which is equal to 3.1 meters squared.  3. Divide the number of kilograms (your weight) by the meters squared number. In this example: 70 ÷ 3.1 = 22.6. This is your BMI.  What do the results mean?  BMI charts are used to identify whether you are underweight, normal weight, overweight, or obese. The following guidelines will be used:  · Underweight: BMI less than 18.5.  · Normal weight: BMI between 18.5 and 24.9.  · Overweight: BMI between 25 and 29.9.  · Obese: BMI of 30 or above.  Keep these notes in mind:  · Weight includes both fat and muscle, so someone with a muscular build, such as an athlete, may have a BMI that is higher than 24.9. In cases like these, BMI is not an accurate measure of body fat.  · To determine if excess body fat is the cause of a BMI of 25 or higher, further assessments may need to be done by a health care provider.  · BMI is usually interpreted in the same way for men and women.  Where to find more information  For more information about BMI, including tools to quickly calculate your BMI, go to these websites:  · Centers for Disease Control and Prevention: www.cdc.gov  · American Heart Association: www.heart.org  · National Heart, Lung, and Blood [place]: www.nhlbi.nih.gov  Summary  · Body mass index (BMI) is a number that is calculated from a person's weight and height.  · BMI may help estimate how much of a person's weight is composed of fat. BMI can help identify those who may be at higher risk for certain medical problems.  · BMI can be measured using English measurements or metric measurements.  · BMI charts are used to identify whether you are underweight, normal " weight, overweight, or obese.  This information is not intended to replace advice given to you by your health care provider. Make sure you discuss any questions you have with your health care provider.  Document Released: 08/29/2005 Document Revised: 09/09/2020 Document Reviewed: 07/17/2020  [employer]vier Patient Education © 2020 [employer]

## 2020-11-06 RX ORDER — OMEPRAZOLE 40 MG/1
40 CAPSULE, DELAYED RELEASE ORAL DAILY
Qty: 30 CAPSULE | Refills: 0 | Status: SHIPPED | OUTPATIENT
Start: 2020-11-06 | End: 2020-12-03

## 2020-11-06 RX ORDER — OMEPRAZOLE 40 MG/1
CAPSULE, DELAYED RELEASE ORAL
Qty: 1 CAPSULE | Refills: 0 | OUTPATIENT
Start: 2020-11-06

## 2020-12-03 DIAGNOSIS — D50.9 IRON DEFICIENCY ANEMIA, UNSPECIFIED IRON DEFICIENCY ANEMIA TYPE: ICD-10-CM

## 2020-12-03 DIAGNOSIS — M79.7 FIBROMYALGIA: ICD-10-CM

## 2020-12-03 RX ORDER — OMEPRAZOLE 40 MG/1
CAPSULE, DELAYED RELEASE ORAL
Qty: 30 CAPSULE | Refills: 3 | Status: SHIPPED | OUTPATIENT
Start: 2020-12-03 | End: 2020-12-03 | Stop reason: SDUPTHER

## 2020-12-03 RX ORDER — DULOXETINE 40 MG/1
CAPSULE, DELAYED RELEASE ORAL
Qty: 90 CAPSULE | Refills: 0 | Status: SHIPPED | OUTPATIENT
Start: 2020-12-03 | End: 2020-12-03 | Stop reason: SDUPTHER

## 2020-12-04 RX ORDER — FERROUS SULFATE 325(65) MG
1 TABLET ORAL
Qty: 30 TABLET | Refills: 3 | Status: SHIPPED | OUTPATIENT
Start: 2020-12-04 | End: 2021-02-15 | Stop reason: SDUPTHER

## 2020-12-04 RX ORDER — DULOXETINE 40 MG/1
1 CAPSULE, DELAYED RELEASE ORAL DAILY
Qty: 90 CAPSULE | Refills: 0 | Status: SHIPPED | OUTPATIENT
Start: 2020-12-04 | End: 2021-02-15 | Stop reason: SDUPTHER

## 2020-12-04 RX ORDER — GABAPENTIN 300 MG/1
300 CAPSULE ORAL NIGHTLY
Qty: 30 CAPSULE | Refills: 3 | Status: SHIPPED | OUTPATIENT
Start: 2020-12-04 | End: 2021-01-04

## 2020-12-04 RX ORDER — OMEPRAZOLE 40 MG/1
40 CAPSULE, DELAYED RELEASE ORAL DAILY
Qty: 30 CAPSULE | Refills: 3 | Status: SHIPPED | OUTPATIENT
Start: 2020-12-04 | End: 2020-12-11

## 2020-12-11 RX ORDER — OMEPRAZOLE 40 MG/1
CAPSULE, DELAYED RELEASE ORAL
Qty: 90 CAPSULE | Refills: 1 | Status: SHIPPED | OUTPATIENT
Start: 2020-12-11 | End: 2021-03-23 | Stop reason: SDUPTHER

## 2021-01-02 DIAGNOSIS — M79.7 FIBROMYALGIA: ICD-10-CM

## 2021-01-04 RX ORDER — GABAPENTIN 300 MG/1
300 CAPSULE ORAL NIGHTLY
Qty: 30 CAPSULE | Refills: 3 | Status: SHIPPED | OUTPATIENT
Start: 2021-01-04 | End: 2021-03-26 | Stop reason: SDUPTHER

## 2021-01-13 RX ORDER — SUCRALFATE 1 G/1
1 TABLET ORAL
Qty: 90 TABLET | Refills: 1 | Status: SHIPPED | OUTPATIENT
Start: 2021-01-13 | End: 2021-03-23 | Stop reason: SDUPTHER

## 2021-02-12 DIAGNOSIS — D50.9 IRON DEFICIENCY ANEMIA, UNSPECIFIED IRON DEFICIENCY ANEMIA TYPE: ICD-10-CM

## 2021-02-16 RX ORDER — DULOXETINE 40 MG/1
CAPSULE, DELAYED RELEASE ORAL
Qty: 90 CAPSULE | Refills: 3 | Status: SHIPPED | OUTPATIENT
Start: 2021-02-16 | End: 2021-03-23 | Stop reason: SDUPTHER

## 2021-02-16 RX ORDER — FERROUS SULFATE 325(65) MG
TABLET ORAL
Qty: 90 TABLET | Refills: 3 | Status: SHIPPED | OUTPATIENT
Start: 2021-02-16 | End: 2022-01-09

## 2021-03-24 RX ORDER — OMEPRAZOLE 40 MG/1
40 CAPSULE, DELAYED RELEASE ORAL DAILY
Qty: 90 CAPSULE | Refills: 0 | Status: SHIPPED | OUTPATIENT
Start: 2021-03-24 | End: 2021-05-04 | Stop reason: SDUPTHER

## 2021-03-24 RX ORDER — SUCRALFATE 1 G/1
1 TABLET ORAL
Qty: 90 TABLET | Refills: 0 | Status: SHIPPED | OUTPATIENT
Start: 2021-03-24 | End: 2021-06-07

## 2021-03-25 RX ORDER — DULOXETINE 40 MG/1
1 CAPSULE, DELAYED RELEASE ORAL DAILY
Qty: 90 CAPSULE | Refills: 0 | Status: SHIPPED | OUTPATIENT
Start: 2021-03-25 | End: 2021-04-01

## 2021-03-26 DIAGNOSIS — M79.7 FIBROMYALGIA: ICD-10-CM

## 2021-03-26 RX ORDER — GABAPENTIN 300 MG/1
300 CAPSULE ORAL NIGHTLY
Qty: 30 CAPSULE | Refills: 3 | Status: SHIPPED | OUTPATIENT
Start: 2021-03-26 | End: 2021-04-28 | Stop reason: SDUPTHER

## 2021-04-01 ENCOUNTER — OFFICE VISIT (OUTPATIENT)
Dept: FAMILY MEDICINE CLINIC | Facility: CLINIC | Age: 71
End: 2021-04-01

## 2021-04-01 VITALS
OXYGEN SATURATION: 100 % | BODY MASS INDEX: 38.15 KG/M2 | SYSTOLIC BLOOD PRESSURE: 138 MMHG | HEIGHT: 65 IN | RESPIRATION RATE: 18 BRPM | HEART RATE: 83 BPM | DIASTOLIC BLOOD PRESSURE: 80 MMHG | WEIGHT: 229 LBS

## 2021-04-01 DIAGNOSIS — M79.7 FIBROMYALGIA: ICD-10-CM

## 2021-04-01 DIAGNOSIS — Z13.220 LIPID SCREENING: ICD-10-CM

## 2021-04-01 DIAGNOSIS — Z91.89 AT RISK FOR OBSTRUCTIVE SLEEP APNEA: Primary | ICD-10-CM

## 2021-04-01 PROCEDURE — 99214 OFFICE O/P EST MOD 30 MIN: CPT | Performed by: STUDENT IN AN ORGANIZED HEALTH CARE EDUCATION/TRAINING PROGRAM

## 2021-04-01 RX ORDER — DULOXETIN HYDROCHLORIDE 60 MG/1
60 CAPSULE, DELAYED RELEASE ORAL DAILY
Qty: 90 CAPSULE | Refills: 1 | Status: SHIPPED | OUTPATIENT
Start: 2021-04-01 | End: 2021-04-02 | Stop reason: SDUPTHER

## 2021-04-01 NOTE — PROGRESS NOTES
"   Subjective:  Sofía Clarke is a 70 y.o. female who presents for establish care     Fibromyalgia; states is on Duloxetine 40mg ER, helps with symptoms, however states that insurance no longer wants to cover.  Was inquiring about switching to 30 mg or 60 mg.    Fatigue; Wakes up feeling fatigued, has daytime fatigue, sleep approximately 12 hrs a night. Admits to snoring. Does have restless leg syndrome, managed well with gabapentin.  Has never had a sleep study before.    GERD; states went to GI here, was put on Prilosec, had a normal EGD.  Tolerating medication well, no early satiety, hematemesis, hematochezia, weight loss.    Patient Active Problem List   Diagnosis   • Morbidly obese (CMS/HCC)   • Gastroesophageal reflux disease   • Nausea   • Dysphagia   • Multiple food allergies     Vitals:    Vitals:    04/01/21 1325   BP: 138/80   Pulse: 83   Resp: 18   SpO2: 100%   Weight: 104 kg (229 lb)   Height: 165.1 cm (65\")     Body mass index is 38.11 kg/m².    Current Outpatient Medications:   •  Cyanocobalamin (B-12) 1000 MCG capsule, Take 1 tablet by mouth Daily., Disp: , Rfl:   •  FeroSul 325 (65 Fe) MG tablet, TAKE 1 TABLET EVERY DAY WITH BREAKFAST, Disp: 90 tablet, Rfl: 3  •  folic acid (FOLVITE) 400 MCG tablet, Take 400 mcg by mouth Daily., Disp: , Rfl:   •  gabapentin (NEURONTIN) 300 MG capsule, Take 1 capsule by mouth Every Night., Disp: 30 capsule, Rfl: 3  •  ibuprofen (ADVIL,MOTRIN) 200 MG tablet, Take 200 mg by mouth Every 6 (Six) Hours As Needed for Mild Pain ., Disp: , Rfl:   •  omeprazole (priLOSEC) 40 MG capsule, Take 1 capsule by mouth Daily., Disp: 90 capsule, Rfl: 0  •  sucralfate (CARAFATE) 1 g tablet, Take 1 tablet by mouth every night at bedtime., Disp: 90 tablet, Rfl: 0  •  tiZANidine (ZANAFLEX) 4 MG tablet, Take 1 tablet by mouth Every 8 (Eight) Hours As Needed for Muscle Spasms., Disp: 90 tablet, Rfl: 3  •  traZODone (DESYREL) 50 MG tablet, Take 1 tablet by mouth Every Night., Disp: 90 " tablet, Rfl: 3  •  vitamin B-6 (PYRIDOXINE) 50 MG tablet, Take 25 mg by mouth Daily., Disp: , Rfl:   •  DULoxetine (CYMBALTA) 60 MG capsule, Take 1 capsule by mouth Daily., Disp: 90 capsule, Rfl: 1    Review of Systems  Review of Systems   Constitutional: Negative for appetite change and fever.   HENT: Negative for sore throat.    Eyes: Negative for discharge and visual disturbance.   Respiratory: Negative for cough and shortness of breath.    Cardiovascular: Negative for chest pain, palpitations and leg swelling.   Gastrointestinal: Negative for abdominal pain, diarrhea and vomiting.   Genitourinary: Negative for dysuria.   Skin: Negative for rash.   Neurological: Negative for light-headedness and headaches.   Psychiatric/Behavioral: Negative for agitation.       Patient Active Problem List   Diagnosis   • Morbidly obese (CMS/HCC)   • Gastroesophageal reflux disease   • Nausea   • Dysphagia   • Multiple food allergies     Past Surgical History:   Procedure Laterality Date   • CARDIAC ABLATION     • CARDIAC CATHETERIZATION     • CARDIAC SURGERY  2000    cath ablation-vein   • COLONOSCOPY  2010/2018   • ENDOSCOPY N/A 10/13/2020    Procedure: ESOPHAGOGASTRODUODENOSCOPY WITH possible DILATATION--bx;  Surgeon: Daniel Michael MD;  Location: [facility] ENDOSCOPY;  Service: Gastroenterology;  Laterality: N/A;   • WISDOM TOOTH EXTRACTION       Social History     Socioeconomic History   • Marital status:      Spouse name: Not on file   • Number of children: Not on file   • Years of education: Not on file   • Highest education level: Not on file   Tobacco Use   • Smoking status: Never Smoker   • Smokeless tobacco: Never Used   Substance and Sexual Activity   • Alcohol use: Never   • Drug use: Never   • Sexual activity: Defer     Family History   Problem Relation Age of Onset   • Emphysema Mother    • Dementia Mother    • Colon polyps Mother    • Diabetes Father    • Cancer Father    • No Known Problems Sister    •  Hypertension Brother    • Diabetes Paternal Grandmother    • Cancer Paternal Grandfather    • Cancer Brother      Admission on 10/13/2020, Discharged on 10/13/2020   Component Date Value Ref Range Status   • Case Report 10/13/2020    Final                    Value:Surgical Pathology Report                         Case: [id number]                                  Authorizing Provider:  Daniel Michael MD        Collected:           10/13/2020 01:41 PM          Ordering Location:     [facility]             Received:            10/14/2020 06:58 AM                                 [facility]                                                     Pathologist:           Demar Florian MD                                                           Specimens:   1) - Gastric, Antrum, antrum bx                                                                     2) - Esophagus, Distal, distal esophagus bx                                               • Final Diagnosis 10/13/2020    Final                    Value:This result contains rich text formatting which cannot be displayed here.   Lab on 10/10/2020   Component Date Value Ref Range Status   • SARS-CoV-2, ROSEMARIE 10/10/2020 Not Detected  Not Detected Final    Comment: This nucleic acid amplification test was developed and its performance  characteristics determined by [facility]. Nucleic acid  amplification tests include PCR and TMA. This test has not been FDA  cleared or approved. This test has been authorized by FDA under an  Emergency Use Authorization (EUA). This test is only authorized for  the duration of time the declaration that circumstances exist  justifying the authorization of the emergency use of in vitro  diagnostic tests for detection of SARS-CoV-2 virus and/or diagnosis  of COVID-19 infection under section 564(b)(1) of the Act, 21 U.S.C.  360bbb-3(b) (1), unless the authorization is terminated or revoked  sooner.  When diagnostic testing  is negative, the possibility of a false  negative result should be considered in the context of a patient's  recent exposures and the presence of clinical signs and symptoms  consistent with COVID-19. An individual without symptoms of COVID-19  and who is not shedding SARS-CoV-2 virus would                            expect to have a  negative (not detected) result in this assay.   • COVID [facility] PRIORITY 10/10/2020 Comment   Final    Received      FL Esophagram Complete  Narrative: PROCEDURE: Barium swallow    HISTORY:  Dysphagia     COMPARISON:  None    Fluoroscopy time was one minute and 37 seconds.  Total # of films = 55     TECHNIQUE:    Mucosal relief views were obtained in the LPO position.  Subsequently, a double contrast esophagram was performed using  effervescent granules followed by thick barium. The esophagus was  evaluated in the LPO position with spot fluoroscopic images  obtained. The single column portion of the exam was performed in  the prone, FERGUSON position.    FINDINGS:  No esophageal filling defect, stricture or mucosal abnormality.  The gastroesophageal junction distends appropriately. There is  normal transit of barium. A moderate-sized hiatal hernia is  present. Gastroesophageal reflux was observed.    The patient was given a 12.5 mm barium tablet which passed  through the esophagus and into the stomach without delay.   Impression: CONCLUSION:    Moderate sized hiatal hernia.  Gastroesophageal reflux.    Electronically signed by:  Tim Ashley MD  5/19/2020 2:32 PM CDT  Workstation: [id number]    @[employer]@    There is no immunization history on file for this patient.    The following portions of the patient's history were reviewed and updated as appropriate: allergies, current medications, past family history, past medical history, past social history, past surgical history and problem list.    Physical Exam  Physical Exam  Constitutional:       Appearance: Normal appearance.   HENT:       Head: Normocephalic and atraumatic.      Right Ear: Tympanic membrane and ear canal normal.      Left Ear: Tympanic membrane and ear canal normal.   Eyes:      General:         Right eye: No discharge.         Left eye: No discharge.      Conjunctiva/sclera: Conjunctivae normal.   Cardiovascular:      Rate and Rhythm: Normal rate and regular rhythm.      Pulses: Normal pulses.      Heart sounds: Normal heart sounds. No murmur heard.     Pulmonary:      Effort: Pulmonary effort is normal. No respiratory distress.      Breath sounds: Normal breath sounds.   Abdominal:      General: There is no distension.      Palpations: Abdomen is soft.      Tenderness: There is no abdominal tenderness.   Musculoskeletal:      Cervical back: Normal range of motion.   Lymphadenopathy:      Cervical: No cervical adenopathy.   Neurological:      Mental Status: She is alert. Mental status is at baseline.   Psychiatric:         Mood and Affect: Mood normal.         Behavior: Behavior normal.       Assessment/Plan    Diagnosis Plan   1. At risk for obstructive sleep apnea  Ambulatory Referral to Sleep Medicine   2. Fibromyalgia  DULoxetine (CYMBALTA) 60 MG capsule   3. Lipid screening  Lipid Panel      Orders Placed This Encounter   Procedures   • Lipid Panel     Standing Status:   Future     Standing Expiration Date:   4/1/2022   • Ambulatory Referral to Sleep Medicine     Referral Priority:   Routine     Referral Type:   Consultation     Referral Reason:   Specialty Services Required     Requested Specialty:   Sleep Medicine     Number of Visits Requested:   1     Fatigue; previous labs reassuring, likely due to sleep apnea, will refer for sleep study.  Follow-up 1 month, if negative, will obtain further lab studies, diagnostic work-up.    Fibromyalgia; well managed on duloxetine 40 mg, however patient states that insurance will no longer cover, is $700 a month, needs something more affordable.  As such, will prescribe 60 mg capsules,  consider 30 mg capsule if insurance refuses to cover.    Cardiac catheterization; not on cholesterol medication, unclear as to why. States has not been on medication, does not recall last lipid level. Will check, low threshold to start statin medication.  Physical exam reassuring, vitals stable.      This document has been electronically signed by Tone Simms MD on April 1, 2021 14:21 CDT

## 2021-04-02 DIAGNOSIS — M79.7 FIBROMYALGIA: ICD-10-CM

## 2021-04-08 RX ORDER — DULOXETIN HYDROCHLORIDE 60 MG/1
60 CAPSULE, DELAYED RELEASE ORAL DAILY
Qty: 90 CAPSULE | Refills: 1 | Status: SHIPPED | OUTPATIENT
Start: 2021-04-08 | End: 2021-12-06 | Stop reason: SDUPTHER

## 2021-04-19 ENCOUNTER — OFFICE VISIT (OUTPATIENT)
Dept: SLEEP MEDICINE | Facility: HOSPITAL | Age: 71
End: 2021-04-19

## 2021-04-19 VITALS
HEIGHT: 65 IN | HEART RATE: 83 BPM | OXYGEN SATURATION: 98 % | SYSTOLIC BLOOD PRESSURE: 122 MMHG | WEIGHT: 228 LBS | DIASTOLIC BLOOD PRESSURE: 73 MMHG | BODY MASS INDEX: 37.99 KG/M2

## 2021-04-19 DIAGNOSIS — G25.81 RESTLESS LEGS SYNDROME (RLS): ICD-10-CM

## 2021-04-19 DIAGNOSIS — R06.83 SNORING: ICD-10-CM

## 2021-04-19 DIAGNOSIS — F51.04 PSYCHOPHYSIOLOGICAL INSOMNIA: ICD-10-CM

## 2021-04-19 DIAGNOSIS — G47.19 EXCESSIVE DAYTIME SLEEPINESS: Primary | ICD-10-CM

## 2021-04-19 PROBLEM — M79.7 FIBROMYALGIA: Status: ACTIVE | Noted: 2021-04-19

## 2021-04-19 PROCEDURE — 99214 OFFICE O/P EST MOD 30 MIN: CPT | Performed by: NURSE PRACTITIONER

## 2021-04-19 NOTE — PROGRESS NOTES
New Patient Sleep Medicine Consultation    Encounter Date: 4/19/2021         Patient's PCP: Tone Simms MD  Referring provider: Tone Simms MD  Reason for consultation chief complaint: snoring, loud disruptive snoring, excessive daytime sleepiness, unrefreshing sleep and insomnia      Sofía Clarke is a 70 y.o. female whose bedtime is ~ 2200. She  falls asleep after 120 + minutes, and is up 1 times per night. She wakes up ~ 1200. She endorses 10-12 hours of sleep. She drinks 0 cups of coffee, 7-8 teas, and 0 sodas per day. She drinks 0 alcoholic beverages per week.    Sofía Clarke admits to snoring, unrestful sleep, excessive daytime sleepiness, morning headaches, irritability, memory loss, restless legs at night, difficulty falling asleep and takes medicine to help go to sleep. She denies cataplexy, sleep paralysis, or hypnagogic hallucinations. She takes trazodone and tizanidine at night.   She has no sleepiness with driving. She naps some days.    She is a never smoker.     She has never had a sleep study. She sleeps in a bed alone.     Past Medical History:   Diagnosis Date   • Anemia    • Colon polyps    • GERD (gastroesophageal reflux disease)    • Hiatal hernia    • Irritable bowel syndrome      Social History     Socioeconomic History   • Marital status:      Spouse name: Not on file   • Number of children: Not on file   • Years of education: Not on file   • Highest education level: Not on file   Tobacco Use   • Smoking status: Never Smoker   • Smokeless tobacco: Never Used   Substance and Sexual Activity   • Alcohol use: Never   • Drug use: Never   • Sexual activity: Defer     Family History   Problem Relation Age of Onset   • Emphysema Mother    • Dementia Mother    • Colon polyps Mother    • Diabetes Father    • Cancer Father    • No Known Problems Sister    • Hypertension Brother    • Diabetes Paternal Grandmother    • Cancer Paternal Grandfather    • Cancer Brother       "        Marital status:    Occupation: retired   Children: 1  2 brothers and 1 sisters  Other family history + for: as above   FH of sleep disorders: unknown     [place] - 13    Review of Systems:  Constitutional: fatigue   Eyes: negative  Ears, nose, mouth, throat, and face: negative  Respiratory: negative  Cardiovascular: negative  Gastrointestinal: negative  Genitourinary:negative  Integument/breast: negative  Hematologic/lymphatic: negative  Musculoskeletal:negative  Neurological: negative  Behavioral/Psych: positive for anxiety and depression  Endocrine: negative  Allergic/Immunologic: negative Patient advised to discuss any positive ROS with PCP.      Vitals:    04/19/21 0918   BP: 122/73   Pulse: 83   SpO2: 98%           04/19/21 0918   Weight: 103 kg (228 lb)       Body mass index is 37.94 kg/m². Patient's Body mass index is 37.94 kg/m². BMI is above normal parameters. Recommendations include: referral to primary care.    Neck circumference: 16.5\"          General: Alert. Cooperative. Well developed. No acute distress.             Head:  Normocephalic. Symmetrical. Atraumatic.              Eyes: Sclera clear. No icterus. PERRLA. Normal EOM.             Ears: No deformities. Normal hearing.             Nose: No septal deviation. No drainage.          Throat: No oral lesions. No thrush. Moist mucous membranes. Trachea midline    Tongue is normal    Dentition is poor       Pharynx: Posterior pharyngeal pillars are narrow    Mallampati score of III (soft and hard palate and base of uvula visible)    Pharynx is nonerythematous   Chest Wall:  Normal shape. Symmetric expansion with respiration. No tenderness.          Lungs:  Clear to auscultation bilaterally. No wheezes. No rhonchi. No rales. Respirations regular, even and unlabored.            Heart:  Regular rhythm and normal rate. Normal S1 and S2. No murmur.     Abdomen:  Soft, non-tender and non-distended. Normal bowel sounds. No masses.  Extremities: "  Moves all extremities well. No edema.           Pulses: Pulses palpable and equal bilaterally.               Skin: Dry. Intact. No bleeding. No rash.           Neuro: Moves all 4 extremities and cranial nerves grossly intact.  Psychiatric: Normal mood and affect.      Current Outpatient Medications:   •  Cyanocobalamin (B-12) 1000 MCG capsule, Take 1 tablet by mouth Daily., Disp: , Rfl:   •  DULoxetine (CYMBALTA) 60 MG capsule, Take 1 capsule by mouth Daily., Disp: 90 capsule, Rfl: 1  •  FeroSul 325 (65 Fe) MG tablet, TAKE 1 TABLET EVERY DAY WITH BREAKFAST, Disp: 90 tablet, Rfl: 3  •  folic acid (FOLVITE) 400 MCG tablet, Take 400 mcg by mouth Daily., Disp: , Rfl:   •  gabapentin (NEURONTIN) 300 MG capsule, Take 1 capsule by mouth Every Night., Disp: 30 capsule, Rfl: 3  •  ibuprofen (ADVIL,MOTRIN) 200 MG tablet, Take 200 mg by mouth Every 6 (Six) Hours As Needed for Mild Pain ., Disp: , Rfl:   •  omeprazole (priLOSEC) 40 MG capsule, Take 1 capsule by mouth Daily., Disp: 90 capsule, Rfl: 0  •  sucralfate (CARAFATE) 1 g tablet, Take 1 tablet by mouth every night at bedtime., Disp: 90 tablet, Rfl: 0  •  tiZANidine (ZANAFLEX) 4 MG tablet, Take 1 tablet by mouth Every 8 (Eight) Hours As Needed for Muscle Spasms., Disp: 90 tablet, Rfl: 3  •  traZODone (DESYREL) 50 MG tablet, Take 1 tablet by mouth Every Night., Disp: 90 tablet, Rfl: 3  •  vitamin B-6 (PYRIDOXINE) 50 MG tablet, Take 25 mg by mouth Daily., Disp: , Rfl:     Lab Results   Component Value Date    WBC 7.82 12/12/2019    HGB 10.7 (L) 12/12/2019    HCT 33.0 (L) 12/12/2019    MCV 67.3 (L) 12/12/2019     12/12/2019     Lab Results   Component Value Date    GLUCOSE 83 12/12/2019    CALCIUM 9.6 12/12/2019     12/12/2019    K 4.3 12/12/2019    CO2 27.7 12/12/2019     12/12/2019    BUN 12 12/12/2019    CREATININE 0.96 12/12/2019    EGFRIFNONA 58 (L) 12/12/2019    BCR 12.5 12/12/2019    ANIONGAP 11.3 12/12/2019     No results found for: INR,  PROTIME  No results found for: CKTOTAL, CKMB, CKMBINDEX, TROPONINI, TROPONINT    No results found for: PHART, RKS9JHR, PO2ART]    Contraindications to home sleep test: none     Assessment and Plan:    1. Excessive daytime sleepiness- New (to me), additional work-up planned (4)  1. Check HST  2. Sleepy driving tips   3. RTC in 2 weeks with study results   2.   Snoring- New to me, additional work-up planned    1. As above   3.   Restless leg syndrome/PLMD New (to me), additional work-up planned (4)  1. Will address after HST   2. Non-pharmacological treatment methods discussed   4.   Insomnia - sleep onset and or maintenance New (to me), additional work-up planned (4)  1. On trazodone   2. Good sleep hygiene   3. Address after HST        5.    Fibromyalgia           I obtained a brief history from the patient, reviewed the medical problems and current medications, and made medical decisions regarding treatment based on that information.   I spent 30 minutes caring for Sofía on this date of service. This time includes time spent by me in the following activities: preparing for the visit, obtaining and/or reviewing a separately obtained history, performing a medically appropriate examination and/or evaluation, counseling and educating the patient/family/caregiver, ordering medications, tests, or procedures and documenting information in the medical record. Discussion involved sleep apnea, sleep hygiene, risks of untreated apnea, sleep study, restless legs. I answered all of the patient's questions and she verbalized understanding.            RTC 2 weeks after testing            This document has been electronically signed by LAVELL Akins on April 19, 2021 09:56 CDT          This document has been electronically signed by LAVELL Akins on April 19, 2021         CC: Tone Simms MD Moody, Leo James, MD

## 2021-04-28 DIAGNOSIS — M79.7 FIBROMYALGIA: ICD-10-CM

## 2021-04-29 RX ORDER — GABAPENTIN 300 MG/1
300 CAPSULE ORAL NIGHTLY
Qty: 30 CAPSULE | Refills: 2 | Status: SHIPPED | OUTPATIENT
Start: 2021-04-29 | End: 2021-08-12 | Stop reason: SDUPTHER

## 2021-05-04 ENCOUNTER — OFFICE VISIT (OUTPATIENT)
Dept: GASTROENTEROLOGY | Facility: CLINIC | Age: 71
End: 2021-05-04

## 2021-05-04 VITALS
WEIGHT: 227.4 LBS | BODY MASS INDEX: 37.89 KG/M2 | SYSTOLIC BLOOD PRESSURE: 127 MMHG | DIASTOLIC BLOOD PRESSURE: 70 MMHG | HEIGHT: 65 IN | HEART RATE: 80 BPM

## 2021-05-04 DIAGNOSIS — K21.00 GASTROESOPHAGEAL REFLUX DISEASE WITH ESOPHAGITIS WITHOUT HEMORRHAGE: Primary | ICD-10-CM

## 2021-05-04 DIAGNOSIS — R10.10 PAIN OF UPPER ABDOMEN: ICD-10-CM

## 2021-05-04 DIAGNOSIS — R11.0 NAUSEA: ICD-10-CM

## 2021-05-04 PROCEDURE — 99213 OFFICE O/P EST LOW 20 MIN: CPT | Performed by: PHYSICIAN ASSISTANT

## 2021-05-04 RX ORDER — OMEPRAZOLE 40 MG/1
40 CAPSULE, DELAYED RELEASE ORAL DAILY
Qty: 90 CAPSULE | Refills: 1 | Status: SHIPPED | OUTPATIENT
Start: 2021-05-04 | End: 2022-01-21

## 2021-06-07 RX ORDER — SUCRALFATE 1 G/1
1 TABLET ORAL
Qty: 90 TABLET | Refills: 0 | Status: SHIPPED | OUTPATIENT
Start: 2021-06-07 | End: 2021-08-05 | Stop reason: SDUPTHER

## 2021-06-22 ENCOUNTER — APPOINTMENT (OUTPATIENT)
Dept: SLEEP MEDICINE | Facility: HOSPITAL | Age: 71
End: 2021-06-22

## 2021-07-27 ENCOUNTER — APPOINTMENT (OUTPATIENT)
Dept: SLEEP MEDICINE | Facility: HOSPITAL | Age: 71
End: 2021-07-27

## 2021-08-05 DIAGNOSIS — M79.7 FIBROMYALGIA: ICD-10-CM

## 2021-08-05 RX ORDER — SUCRALFATE 1 G/1
1 TABLET ORAL
Qty: 90 TABLET | Refills: 0 | Status: SHIPPED | OUTPATIENT
Start: 2021-08-05 | End: 2021-08-13

## 2021-08-05 NOTE — TELEPHONE ENCOUNTER
Rx Refill Note  Requested Prescriptions     Pending Prescriptions Disp Refills   • gabapentin (NEURONTIN) 300 MG capsule 30 capsule 2     Sig: Take 1 capsule by mouth Every Night.      Last office visit with prescribing clinician: 4/1/2021      Next office visit with prescribing clinician: Visit date not found            Mary Conner Rep  08/05/21, 15:45 CDT

## 2021-08-06 NOTE — TELEPHONE ENCOUNTER
Rx Refill Note  Requested Prescriptions     Pending Prescriptions Disp Refills   • traZODone (DESYREL) 50 MG tablet 90 tablet 3     Sig: Take 1 tablet by mouth Every Night.      Last office visit with prescribing clinician: Visit date not found      Next office visit with prescribing clinician: Visit date not found            Mary Conner Rep  08/06/21, 08:10 CDT

## 2021-08-09 RX ORDER — TRAZODONE HYDROCHLORIDE 50 MG/1
50 TABLET ORAL NIGHTLY
Qty: 60 TABLET | Refills: 0 | Status: SHIPPED | OUTPATIENT
Start: 2021-08-09 | End: 2021-08-12 | Stop reason: SDUPTHER

## 2021-08-09 RX ORDER — GABAPENTIN 300 MG/1
300 CAPSULE ORAL NIGHTLY
Qty: 30 CAPSULE | Refills: 2 | OUTPATIENT
Start: 2021-08-09

## 2021-08-11 ENCOUNTER — TELEPHONE (OUTPATIENT)
Dept: FAMILY MEDICINE CLINIC | Facility: CLINIC | Age: 71
End: 2021-08-11

## 2021-08-12 ENCOUNTER — OFFICE VISIT (OUTPATIENT)
Dept: FAMILY MEDICINE CLINIC | Facility: CLINIC | Age: 71
End: 2021-08-12

## 2021-08-12 ENCOUNTER — LAB (OUTPATIENT)
Dept: LAB | Facility: HOSPITAL | Age: 71
End: 2021-08-12

## 2021-08-12 VITALS
SYSTOLIC BLOOD PRESSURE: 120 MMHG | OXYGEN SATURATION: 96 % | BODY MASS INDEX: 36.57 KG/M2 | WEIGHT: 219.5 LBS | DIASTOLIC BLOOD PRESSURE: 70 MMHG | HEIGHT: 65 IN | HEART RATE: 98 BPM | TEMPERATURE: 97.5 F

## 2021-08-12 DIAGNOSIS — Z13.220 LIPID SCREENING: ICD-10-CM

## 2021-08-12 DIAGNOSIS — G47.00 INSOMNIA, UNSPECIFIED TYPE: ICD-10-CM

## 2021-08-12 DIAGNOSIS — Z79.899 ENCOUNTER FOR LONG-TERM CURRENT USE OF MEDICATION: ICD-10-CM

## 2021-08-12 DIAGNOSIS — Z11.59 NEED FOR HEPATITIS C SCREENING TEST: ICD-10-CM

## 2021-08-12 DIAGNOSIS — Z78.0 POSTMENOPAUSE: ICD-10-CM

## 2021-08-12 DIAGNOSIS — M79.7 FIBROMYALGIA: Primary | ICD-10-CM

## 2021-08-12 DIAGNOSIS — D50.9 IRON DEFICIENCY ANEMIA, UNSPECIFIED IRON DEFICIENCY ANEMIA TYPE: ICD-10-CM

## 2021-08-12 PROCEDURE — 84466 ASSAY OF TRANSFERRIN: CPT | Performed by: STUDENT IN AN ORGANIZED HEALTH CARE EDUCATION/TRAINING PROGRAM

## 2021-08-12 PROCEDURE — 85007 BL SMEAR W/DIFF WBC COUNT: CPT | Performed by: STUDENT IN AN ORGANIZED HEALTH CARE EDUCATION/TRAINING PROGRAM

## 2021-08-12 PROCEDURE — 80053 COMPREHEN METABOLIC PANEL: CPT | Performed by: STUDENT IN AN ORGANIZED HEALTH CARE EDUCATION/TRAINING PROGRAM

## 2021-08-12 PROCEDURE — 99214 OFFICE O/P EST MOD 30 MIN: CPT | Performed by: STUDENT IN AN ORGANIZED HEALTH CARE EDUCATION/TRAINING PROGRAM

## 2021-08-12 PROCEDURE — 86803 HEPATITIS C AB TEST: CPT | Performed by: STUDENT IN AN ORGANIZED HEALTH CARE EDUCATION/TRAINING PROGRAM

## 2021-08-12 PROCEDURE — 82728 ASSAY OF FERRITIN: CPT | Performed by: STUDENT IN AN ORGANIZED HEALTH CARE EDUCATION/TRAINING PROGRAM

## 2021-08-12 PROCEDURE — 85025 COMPLETE CBC W/AUTO DIFF WBC: CPT | Performed by: STUDENT IN AN ORGANIZED HEALTH CARE EDUCATION/TRAINING PROGRAM

## 2021-08-12 PROCEDURE — 80061 LIPID PANEL: CPT

## 2021-08-12 PROCEDURE — 83540 ASSAY OF IRON: CPT | Performed by: STUDENT IN AN ORGANIZED HEALTH CARE EDUCATION/TRAINING PROGRAM

## 2021-08-12 RX ORDER — GABAPENTIN 300 MG/1
300 CAPSULE ORAL NIGHTLY
Qty: 30 CAPSULE | Refills: 2 | Status: SHIPPED | OUTPATIENT
Start: 2021-08-12 | End: 2021-10-02 | Stop reason: SDUPTHER

## 2021-08-12 RX ORDER — TRAZODONE HYDROCHLORIDE 50 MG/1
50 TABLET ORAL NIGHTLY
Qty: 90 TABLET | Refills: 1 | Status: SHIPPED | OUTPATIENT
Start: 2021-08-12 | End: 2022-01-27

## 2021-08-13 LAB
ALBUMIN SERPL-MCNC: 4.5 G/DL (ref 3.5–5.2)
ALBUMIN/GLOB SERPL: 1.8 G/DL
ALP SERPL-CCNC: 77 U/L (ref 39–117)
ALT SERPL W P-5'-P-CCNC: 15 U/L (ref 1–33)
ANION GAP SERPL CALCULATED.3IONS-SCNC: 12.4 MMOL/L (ref 5–15)
ANISOCYTOSIS BLD QL: ABNORMAL
AST SERPL-CCNC: 16 U/L (ref 1–32)
BASO STIPL COARSE BLD QL SMEAR: ABNORMAL
BASOPHILS # BLD MANUAL: 0.06 10*3/MM3 (ref 0–0.2)
BASOPHILS NFR BLD AUTO: 1 % (ref 0–1.5)
BILIRUB SERPL-MCNC: 0.8 MG/DL (ref 0–1.2)
BUN SERPL-MCNC: 12 MG/DL (ref 8–23)
BUN/CREAT SERPL: 13.5 (ref 7–25)
BURR CELLS BLD QL SMEAR: ABNORMAL
CALCIUM SPEC-SCNC: 9.1 MG/DL (ref 8.6–10.5)
CHLORIDE SERPL-SCNC: 104 MMOL/L (ref 98–107)
CHOLEST SERPL-MCNC: 143 MG/DL (ref 0–200)
CO2 SERPL-SCNC: 23.6 MMOL/L (ref 22–29)
CREAT SERPL-MCNC: 0.89 MG/DL (ref 0.57–1)
DEPRECATED RDW RBC AUTO: 38.6 FL (ref 37–54)
EOSINOPHIL # BLD MANUAL: 0.06 10*3/MM3 (ref 0–0.4)
EOSINOPHIL NFR BLD MANUAL: 1 % (ref 0.3–6.2)
ERYTHROCYTE [DISTWIDTH] IN BLOOD BY AUTOMATED COUNT: 16.3 % (ref 12.3–15.4)
FERRITIN SERPL-MCNC: 151 NG/ML (ref 13–150)
GFR SERPL CREATININE-BSD FRML MDRD: 63 ML/MIN/1.73
GLOBULIN UR ELPH-MCNC: 2.5 GM/DL
GLUCOSE SERPL-MCNC: 112 MG/DL (ref 65–99)
HCT VFR BLD AUTO: 34.6 % (ref 34–46.6)
HDLC SERPL-MCNC: 40 MG/DL (ref 40–60)
HGB BLD-MCNC: 10.6 G/DL (ref 12–15.9)
IRON 24H UR-MRATE: 154 MCG/DL (ref 37–145)
IRON SATN MFR SERPL: 46 % (ref 20–50)
LDLC SERPL CALC-MCNC: 82 MG/DL (ref 0–100)
LDLC/HDLC SERPL: 2 {RATIO}
LYMPHOCYTES # BLD MANUAL: 2.87 10*3/MM3 (ref 0.7–3.1)
LYMPHOCYTES NFR BLD MANUAL: 3.1 % (ref 5–12)
LYMPHOCYTES NFR BLD MANUAL: 50.5 % (ref 19.6–45.3)
MCH RBC QN AUTO: 21 PG (ref 26.6–33)
MCHC RBC AUTO-ENTMCNC: 30.6 G/DL (ref 31.5–35.7)
MCV RBC AUTO: 68.7 FL (ref 79–97)
MICROCYTES BLD QL: ABNORMAL
MONOCYTES # BLD AUTO: 0.18 10*3/MM3 (ref 0.1–0.9)
NEUTROPHILS # BLD AUTO: 2.52 10*3/MM3 (ref 1.7–7)
NEUTROPHILS NFR BLD MANUAL: 44.3 % (ref 42.7–76)
PLAT MORPH BLD: NORMAL
PLATELET # BLD AUTO: 301 10*3/MM3 (ref 140–450)
PMV BLD AUTO: 10.4 FL (ref 6–12)
POIKILOCYTOSIS BLD QL SMEAR: ABNORMAL
POTASSIUM SERPL-SCNC: 4 MMOL/L (ref 3.5–5.2)
PROT SERPL-MCNC: 7 G/DL (ref 6–8.5)
RBC # BLD AUTO: 5.04 10*6/MM3 (ref 3.77–5.28)
SMUDGE CELLS BLD QL SMEAR: ABNORMAL
SODIUM SERPL-SCNC: 140 MMOL/L (ref 136–145)
TIBC SERPL-MCNC: 334 MCG/DL (ref 298–536)
TRANSFERRIN SERPL-MCNC: 224 MG/DL (ref 200–360)
TRIGL SERPL-MCNC: 115 MG/DL (ref 0–150)
VLDLC SERPL-MCNC: 21 MG/DL (ref 5–40)
WBC # BLD AUTO: 5.69 10*3/MM3 (ref 3.4–10.8)

## 2021-08-13 RX ORDER — SUCRALFATE 1 G/1
1 TABLET ORAL
Qty: 90 TABLET | Refills: 1 | Status: SHIPPED | OUTPATIENT
Start: 2021-08-13 | End: 2022-03-11

## 2021-08-14 LAB
HCV AB S/CO SERPL IA: <0.1 S/CO RATIO (ref 0–0.9)
HCV AB SERPL QL IA: NORMAL

## 2021-08-25 ENCOUNTER — TELEPHONE (OUTPATIENT)
Dept: FAMILY MEDICINE CLINIC | Facility: CLINIC | Age: 71
End: 2021-08-25

## 2021-08-25 DIAGNOSIS — D50.9 MICROCYTIC ANEMIA: Primary | ICD-10-CM

## 2021-08-25 NOTE — TELEPHONE ENCOUNTER
----- Message from Tone Simms MD sent at 8/25/2021  2:47 PM CDT -----  Labs significant for anemia, hemoglobin was 10.6 which is stable from 1 year ago.  There is no iron deficiency, as such, I have referred you to hematology for further work-up and management.

## 2021-08-25 NOTE — TELEPHONE ENCOUNTER
----- Message from Sofía Clarke sent at 8/25/2021  3:38 PM CDT -----  Regarding: Non-Urgent Medical Question  Contact: [phone]  On September 30, I have an appointment with Dr. Messina for 27 dental extractions in order to proceed with dentures, top and bottom. To my understanding, I have done all that was requested of me.  ... I am due flu and CoVid vaccinations but do not know when to take them before oral surgery or after. Please advise. Sofía Clarke (born 1950).

## 2021-08-25 NOTE — TELEPHONE ENCOUNTER
----- Message from Sofía Clarke sent at 8/25/2021  3:38 PM CDT -----  Regarding: Non-Urgent Medical Question  Contact: [phone]  On September 30, I have an appointment with Dr. Messina for 27 dental extractions in order to proceed with dentures, top and bottom. To my understanding, I have done all that was requested of me.  ... I am due flu and CoVid vaccinations but do not know when to take them before oral surgery or after. Please advise. Sofía Clarke (born 1950).

## 2021-09-28 DIAGNOSIS — Z78.0 POSTMENOPAUSE: ICD-10-CM

## 2021-10-02 DIAGNOSIS — M79.7 FIBROMYALGIA: ICD-10-CM

## 2021-10-04 NOTE — TELEPHONE ENCOUNTER
Rx Refill Note  Requested Prescriptions     Pending Prescriptions Disp Refills   • gabapentin (NEURONTIN) 300 MG capsule 30 capsule 2     Sig: Take 1 capsule by mouth Every Night.      Last office visit with prescribing clinician: 8/12/2021      Next office visit with prescribing clinician: 11/11/2021            Mary Conner Rep  10/04/21, 08:47 CDT

## 2021-10-05 RX ORDER — GABAPENTIN 300 MG/1
300 CAPSULE ORAL NIGHTLY
Qty: 30 CAPSULE | Refills: 1 | Status: SHIPPED | OUTPATIENT
Start: 2021-10-05 | End: 2021-12-06 | Stop reason: SDUPTHER

## 2021-10-12 ENCOUNTER — TELEPHONE (OUTPATIENT)
Dept: FAMILY MEDICINE CLINIC | Facility: CLINIC | Age: 71
End: 2021-10-12

## 2021-11-16 ENCOUNTER — OFFICE VISIT (OUTPATIENT)
Dept: GASTROENTEROLOGY | Facility: CLINIC | Age: 71
End: 2021-11-16

## 2021-11-16 VITALS
SYSTOLIC BLOOD PRESSURE: 120 MMHG | HEIGHT: 65 IN | DIASTOLIC BLOOD PRESSURE: 80 MMHG | HEART RATE: 113 BPM | BODY MASS INDEX: 33.99 KG/M2 | WEIGHT: 204 LBS

## 2021-11-16 DIAGNOSIS — E53.8 FOLATE DEFICIENCY: ICD-10-CM

## 2021-11-16 DIAGNOSIS — K21.00 GASTROESOPHAGEAL REFLUX DISEASE WITH ESOPHAGITIS WITHOUT HEMORRHAGE: ICD-10-CM

## 2021-11-16 DIAGNOSIS — D64.9 CHRONIC ANEMIA: Primary | ICD-10-CM

## 2021-11-16 DIAGNOSIS — R10.10 PAIN OF UPPER ABDOMEN: ICD-10-CM

## 2021-11-16 PROCEDURE — 99214 OFFICE O/P EST MOD 30 MIN: CPT | Performed by: PHYSICIAN ASSISTANT

## 2021-11-16 RX ORDER — ACETAMINOPHEN 500 MG
500 TABLET ORAL AS NEEDED
COMMUNITY

## 2021-11-16 NOTE — PATIENT INSTRUCTIONS
MyPlate from USDA    MyPlate is an outline of a general healthy diet based on the 2010 Dietary Guidelines for Americans, from the U.S. Department of Agriculture (USDA). It sets guidelines for how much food you should eat from each food group based on your age, sex, and level of physical activity.  What are tips for following MyPlate?  To follow MyPlate recommendations:  · Eat a wide variety of fruits and vegetables, grains, and protein foods.  · Serve smaller portions and eat less food throughout the day.  · Limit portion sizes to avoid overeating.  · Enjoy your food.  · Get at least 150 minutes of exercise every week. This is about 30 minutes each day, 5 or more days per week.  It can be difficult to have every meal look like MyPlate. Think about MyPlate as eating guidelines for an entire day, rather than each individual meal.  Fruits and vegetables  · Make half of your plate fruits and vegetables.  · Eat many different colors of fruits and vegetables each day.  · For a 2,000 calorie daily food plan, eat:  ? 2½ cups of vegetables every day.  ? 2 cups of fruit every day.  · 1 cup is equal to:  ? 1 cup raw or cooked vegetables.  ? 1 cup raw fruit.  ? 1 medium-sized orange, apple, or banana.  ? 1 cup 100% fruit or vegetable juice.  ? 2 cups raw leafy greens, such as lettuce, spinach, or kale.  ? ½ cup dried fruit.  Grains  · One fourth of your plate should be grains.  · Make at least half of the grains you eat each day whole grains.  · For a 2,000 calorie daily food plan, eat 6 oz of grains every day.  · 1 oz is equal to:  ? 1 slice bread.  ? 1 cup cereal.  ? ½ cup cooked rice, cereal, or pasta.  Protein  · One fourth of your plate should be protein.  · Eat a wide variety of protein foods, including meat, poultry, fish, eggs, beans, nuts, and tofu.  · For a 2,000 calorie daily food plan, eat 5½ oz of protein every day.  · 1 oz is equal to:  ? 1 oz meat, poultry, or fish.  ? ¼ cup cooked beans.  ? 1 egg.  ? ½ oz  nuts or seeds.  ? 1 Tbsp peanut butter.  Dairy  · Drink fat-free or low-fat (1%) milk.  · Eat or drink dairy as a side to meals.  · For a 2,000 calorie daily food plan, eat or drink 3 cups of dairy every day.  · 1 cup is equal to:  ? 1 cup milk, yogurt, cottage cheese, or soy milk (soy beverage).  ? 2 oz processed cheese.  ? 1½ oz natural cheese.  Fats, oils, salt, and sugars  · Only small amounts of oils are recommended.  · Avoid foods that are high in calories and low in nutritional value (empty calories), like foods high in fat or added sugars.  · Choose foods that are low in salt (sodium). Choose foods that have less than 140 milligrams (mg) of sodium per serving.  · Drink water instead of sugary drinks. Drink enough water each day to keep your urine pale yellow.  Where to find support  · Work with your health care provider or a nutrition specialist (dietitian) to develop a customized eating plan that is right for you.  · Download an kate (mobile application) to help you track your daily food intake.  Where to find more information  · Go to ChooseMyPlate.gov for more information.  Summary  · MyPlate is a general guideline for healthy eating from the USDA. It is based on the 2010 Dietary Guidelines for Americans.  · In general, fruits and vegetables should take up ½ of your plate, grains should take up ¼ of your plate, and protein should take up ¼ of your plate.  This information is not intended to replace advice given to you by your health care provider. Make sure you discuss any questions you have with your health care provider.  Document Revised: 05/21/2020 Document Reviewed: 03/19/2018  [employer] Patient Education © 2021 Elsevier Inc.  BMI for Adults  What is BMI?  Body mass index (BMI) is a number that is calculated from a person's weight and height. BMI can help estimate how much of a person's weight is composed of fat. BMI does not measure body fat directly. Rather, it is an alternative to procedures that  "directly measure body fat, which can be difficult and expensive.  BMI can help identify people who may be at higher risk for certain medical problems.  What are BMI measurements used for?  BMI is used as a screening tool to identify possible weight problems. It helps determine whether a person is obese, overweight, a healthy weight, or underweight.  BMI is useful for:  · Identifying a weight problem that may be related to a medical condition or may increase the risk for medical problems.  · Promoting changes, such as changes in diet and exercise, to help reach a healthy weight. BMI screening can be repeated to see if these changes are working.  How is BMI calculated?  BMI involves measuring your weight in relation to your height. Both height and weight are measured, and the BMI is calculated from those numbers. This can be done either in English (U.S[place] or metric measurements. Note that charts and online BMI calculators are available to help you find your BMI quickly and easily without having to do these calculations yourself.  To calculate your BMI in English (U.S[place] measurements:    1. Measure your weight in pounds (lb).  2. Multiply the number of pounds by 703.  ? For example, for a person who weighs 180 lb, multiply that number by 703, which equals 126,540.  3. Measure your height in inches. Then multiply that number by itself to get a measurement called \"inches squared.\"  ? For example, for a person who is 70 inches tall, the \"inches squared\" measurement is 70 inches x 70 inches, which equals 4,900 inches squared.  4. Divide the total from step 2 (number of lb x 703) by the total from step 3 (inches squared): 126,540 ÷ 4,900 = 25.8. This is your BMI.    To calculate your BMI in metric measurements:  1. Measure your weight in kilograms (kg).  2. Measure your height in meters (m). Then multiply that number by itself to get a measurement called \"meters squared.\"  ? For example, for a person who is 1.75 m tall, the " "\"meters squared\" measurement is 1.75 m x 1.75 m, which is equal to 3.1 meters squared.  3. Divide the number of kilograms (your weight) by the meters squared number. In this example: 70 ÷ 3.1 = 22.6. This is your BMI.  What do the results mean?  BMI charts are used to identify whether you are underweight, normal weight, overweight, or obese. The following guidelines will be used:  · Underweight: BMI less than 18.5.  · Normal weight: BMI between 18.5 and 24.9.  · Overweight: BMI between 25 and 29.9.  · Obese: BMI of 30 or above.  Keep these notes in mind:  · Weight includes both fat and muscle, so someone with a muscular build, such as an athlete, may have a BMI that is higher than 24.9. In cases like these, BMI is not an accurate measure of body fat.  · To determine if excess body fat is the cause of a BMI of 25 or higher, further assessments may need to be done by a health care provider.  · BMI is usually interpreted in the same way for men and women.  Where to find more information  For more information about BMI, including tools to quickly calculate your BMI, go to these websites:  · Centers for Disease Control and Prevention: www.cdc.gov  · American Heart Association: www.heart.org  · National Heart, Lung, and Blood [place]: www.nhlbi.nih.gov  Summary  · Body mass index (BMI) is a number that is calculated from a person's weight and height.  · BMI may help estimate how much of a person's weight is composed of fat. BMI can help identify those who may be at higher risk for certain medical problems.  · BMI can be measured using English measurements or metric measurements.  · BMI charts are used to identify whether you are underweight, normal weight, overweight, or obese.  This information is not intended to replace advice given to you by your health care provider. Make sure you discuss any questions you have with your health care provider.  Document Revised: 09/09/2020 Document Reviewed: 07/17/2020  [employer] " Patient Education © 2021 Elsevier Inc.

## 2021-11-16 NOTE — PROGRESS NOTES
Chief Complaint   Patient presents with   • Heartburn   • Nausea   • Abdominal Pain       ENDO PROCEDURE ORDERED:    Subjective    Sofía Clarke is a 71 y.o. female. she is here today for follow-up.    History of Present Illness    This is a patient seen on a recheck of her GERD, nausea, and abdominal pain. Last seen 05/04/2021. The patient states that she is still sleeping a lot. She is on Prilosec and Carafate for chronic heartburn. Denied nausea, vomiting, and dysphagia. She denies seeing any blood in her stool. She is still apparently taking her iron and folic acid. She had some dental surgery and weight is down 13.6 pounds since then on her last visit. Last EGD with dilatation 10/13/2020. She thinks her last colonoscopy was in 2018.     Studies done 08/12/2021 hepatitis C antibody nonreactive. CBC showed anemia with hemoglobin 10.6, MCV 68.7, she was sent to hematology. Cholesterol panel noted. Iron 154 with 46% saturation, ferritin 151. CMP showed glucose 112, otherwise normal. Bone density was okay at [facility] on 09/24/2021.     The patient saw Megan Lim at [facility] on 10/26/2021, immunoglobulins were okay, protein electrophoresis. She apparently thinks the patient has a beta thalassemia. Normal B12, folate was low at 7.5, ferritin 107, iron 86 with 27% saturation. Normal CMP, normal haptoglobin, normal LDH. Retic count was 3.0 with CBC showing a hemoglobin of 10.2, hematocrit 34.2.     ASSESSMENT/PLAN: I am concerned about the patient’s high reticulocyte count. She has had high irons but repeat is more close to normal. She may have beta thalassemia; she does have folate deficiency. She was encouraged to continue to follow-up with hematology. I did recommend occult stools x3 just to confirm she does not have any GI blood loss. We may need to repeat a colonoscopy or consider small bowel evaluation. I am concerned about her weight loss. We will plan follow-up in a few months, sooner if needed,  further pending clinical course and the results of the above.       The following portions of the patient's history were reviewed and updated as appropriate:   Past Medical History:   Diagnosis Date   • Anemia    • Colon polyps    • GERD (gastroesophageal reflux disease)    • Hiatal hernia    • Irritable bowel syndrome      Past Surgical History:   Procedure Laterality Date   • CARDIAC ABLATION     • CARDIAC CATHETERIZATION     • CARDIAC SURGERY  2000    cath ablation-vein   • COLONOSCOPY  2010/2018   • ENDOSCOPY N/A 10/13/2020    Procedure: ESOPHAGOGASTRODUODENOSCOPY WITH possible DILATATION--bx;  Surgeon: Daniel Michael MD;  Location: [facility] ENDOSCOPY;  Service: Gastroenterology;  Laterality: N/A;   • WISDOM TOOTH EXTRACTION       Family History   Problem Relation Age of Onset   • Emphysema Mother    • Dementia Mother    • Colon polyps Mother    • Diabetes Father    • Cancer Father    • No Known Problems Sister    • Hypertension Brother    • Diabetes Paternal Grandmother    • Cancer Paternal Grandfather    • Cancer Brother      OB History    No obstetric history on file.       Allergies   Allergen Reactions   • Aspirin GI Intolerance   • Codeine Nausea And Vomiting   • Lyrica [Pregabalin] Mental Status Change   • Penicillins GI Intolerance   • Latex Rash     Social History     Socioeconomic History   • Marital status:    Tobacco Use   • Smoking status: Never Smoker   • Smokeless tobacco: Never Used   Vaping Use   • Vaping Use: Never used   Substance and Sexual Activity   • Alcohol use: Never   • Drug use: Never   • Sexual activity: Defer     Current Medications:  Prior to Admission medications    Medication Sig Start Date End Date Taking? Authorizing Provider   acetaminophen (TYLENOL) 500 MG tablet Take 500 mg by mouth As Needed for Mild Pain .   Yes ProviderKhoi MD   Cyanocobalamin (B-12) 1000 MCG capsule Take 1 tablet by mouth Daily.   Yes ProviderKhoi MD   DULoxetine (CYMBALTA) 60 MG  "capsule Take 1 capsule by mouth Daily. 4/8/21  Yes Tone Simms MD   FeroSul 325 (65 Fe) MG tablet TAKE 1 TABLET EVERY DAY WITH BREAKFAST 2/16/21  Yes Alaina Gonsales APRN   folic acid (FOLVITE) 400 MCG tablet Take 400 mcg by mouth Daily.   Yes ProviderKhoi MD   gabapentin (NEURONTIN) 300 MG capsule Take 1 capsule by mouth Every Night. 10/5/21  Yes Tone Simms MD   omeprazole (priLOSEC) 40 MG capsule Take 1 capsule by mouth Daily. 5/4/21  Yes Monty Stringer PA-C   sucralfate (CARAFATE) 1 g tablet TAKE 1 TABLET BY MOUTH EVERY NIGHT AT BEDTIME. 8/13/21  Yes Monty Stringer PA-C   tiZANidine (ZANAFLEX) 4 MG tablet Take 1 tablet by mouth Every 8 (Eight) Hours As Needed for Muscle Spasms. 10/16/20  Yes Alaina Gonsales APRN   traZODone (DESYREL) 50 MG tablet Take 1 tablet by mouth Every Night. 8/12/21  Yes Tone Simms MD   vitamin B-6 (PYRIDOXINE) 50 MG tablet Take 25 mg by mouth Daily.   Yes ProviderKhoi MD   ibuprofen (ADVIL,MOTRIN) 200 MG tablet Take 200 mg by mouth Every 6 (Six) Hours As Needed for Mild Pain .    ProviderKhoi MD     Review of Systems  Review of Systems       Objective    /80   Pulse 113   Ht 165.1 cm (65\")   Wt 92.5 kg (204 lb)   LMP  (LMP Unknown)   BMI 33.95 kg/m²   Physical Exam  Vitals and nursing note reviewed.   Constitutional:       General: She is not in acute distress.     Appearance: She is well-developed.   HENT:      Head: Normocephalic and atraumatic.   Eyes:      Pupils: Pupils are equal, round, and reactive to light.   Cardiovascular:      Rate and Rhythm: Normal rate and regular rhythm.      Heart sounds: Normal heart sounds.   Pulmonary:      Effort: Pulmonary effort is normal.      Breath sounds: Normal breath sounds.   Abdominal:      General: Bowel sounds are normal. There is no distension or abdominal bruit.      Palpations: Abdomen is soft. Abdomen is not rigid. There is no shifting dullness or mass.      " Tenderness: There is abdominal tenderness. There is no guarding or rebound.      Hernia: No hernia is present. There is no hernia in the ventral area.   Musculoskeletal:         General: Normal range of motion.      Cervical back: Normal range of motion.   Skin:     General: Skin is warm and dry.   Neurological:      Mental Status: She is alert and oriented to person, place, and time.   Psychiatric:         Behavior: Behavior normal.         Thought Content: Thought content normal.         Judgment: Judgment normal.       Assessment/Plan      1. Chronic anemia    2. Folate deficiency    3. Gastroesophageal reflux disease with esophagitis without hemorrhage    4. Pain of upper abdomen    .   Diagnoses and all orders for this visit:    1. Chronic anemia (Primary)  -     Occult Blood X 3, Stool - Stool, Per Rectum    2. Folate deficiency  -     Occult Blood X 3, Stool - Stool, Per Rectum    3. Gastroesophageal reflux disease with esophagitis without hemorrhage    4. Pain of upper abdomen        Orders placed during this encounter include:  Orders Placed This Encounter   Procedures   • Occult Blood X 3, Stool - Stool, Per Rectum     Order Specific Question:   Is this occult blood testing for Screening purposes?     Answer:   Yes     Comments:   anemia     Order Specific Question:   Release to patient     Answer:   Immediate       Medications prescribed:  No orders of the defined types were placed in this encounter.      Requested Prescriptions      No prescriptions requested or ordered in this encounter       Review and/or summary of lab tests, radiology, procedures, medications. Review and summary of old records and obtaining of history. The risks and benefits of my recommendations, as well as other treatment options were discussed with the patient today. Questions were answered.    Follow-up: Return in about 3 months (around 2/16/2022).     * Surgery not found *      This document has been electronically signed by  Monty Stringer PA-C on November 29, 2021 17:17 CST      Results for orders placed or performed in visit on 08/12/21   Lipid Panel    Specimen: Blood   Result Value Ref Range    Total Cholesterol 143 0 - 200 mg/dL    Triglycerides 115 0 - 150 mg/dL    HDL Cholesterol 40 40 - 60 mg/dL    LDL Cholesterol  82 0 - 100 mg/dL    VLDL Cholesterol 21 5 - 40 mg/dL    LDL/HDL Ratio 2.00    Results for orders placed or performed in visit on 08/12/21   HCV Antibody Rfx To Qnt PCR    Specimen: Blood   Result Value Ref Range    Hepatitis C Ab <0.1 0.0 - 0.9 s/co ratio   Interpretation:    Specimen: Blood   Result Value Ref Range    Interpretation Comment    CBC Auto Differential    Specimen: Blood   Result Value Ref Range    WBC 5.69 3.40 - 10.80 10*3/mm3    RBC 5.04 3.77 - 5.28 10*6/mm3    Hemoglobin 10.6 (L) 12.0 - 15.9 g/dL    Hematocrit 34.6 34.0 - 46.6 %    MCV 68.7 (L) 79.0 - 97.0 fL    MCH 21.0 (L) 26.6 - 33.0 pg    MCHC 30.6 (L) 31.5 - 35.7 g/dL    RDW 16.3 (H) 12.3 - 15.4 %    RDW-SD 38.6 37.0 - 54.0 fl    MPV 10.4 6.0 - 12.0 fL    Platelets 301 140 - 450 10*3/mm3   Iron Profile    Specimen: Blood   Result Value Ref Range    Iron 154 (H) 37 - 145 mcg/dL    Iron Saturation 46 20 - 50 %    Transferrin 224 200 - 360 mg/dL    TIBC 334 298 - 536 mcg/dL   Manual Differential    Specimen: Blood   Result Value Ref Range    Neutrophil % 44.3 42.7 - 76.0 %    Lymphocyte % 50.5 (H) 19.6 - 45.3 %    Monocyte % 3.1 (L) 5.0 - 12.0 %    Eosinophil % 1.0 0.3 - 6.2 %    Basophil % 1.0 0.0 - 1.5 %    Neutrophils Absolute 2.52 1.70 - 7.00 10*3/mm3    Lymphocytes Absolute 2.87 0.70 - 3.10 10*3/mm3    Monocytes Absolute 0.18 0.10 - 0.90 10*3/mm3    Eosinophils Absolute 0.06 0.00 - 0.40 10*3/mm3    Basophils Absolute 0.06 0.00 - 0.20 10*3/mm3    Anisocytosis Mod/2+ None Seen    Basophilic Stippling Slight/1+ None Seen    Brennen Cells Mod/2+ None Seen    Microcytes Slight/1+ None Seen    Poikilocytes Mod/2+ None Seen    Smudge Cells Slight/1+  None Seen    Platelet Morphology Normal Normal   Ferritin    Specimen: Blood   Result Value Ref Range    Ferritin 151.00 (H) 13.00 - 150.00 ng/mL   Comprehensive Metabolic Panel    Specimen: Blood   Result Value Ref Range    Glucose 112 (H) 65 - 99 mg/dL    BUN 12 8 - 23 mg/dL    Creatinine 0.89 0.57 - 1.00 mg/dL    Sodium 140 136 - 145 mmol/L    Potassium 4.0 3.5 - 5.2 mmol/L    Chloride 104 98 - 107 mmol/L    CO2 23.6 22.0 - 29.0 mmol/L    Calcium 9.1 8.6 - 10.5 mg/dL    Total Protein 7.0 6.0 - 8.5 g/dL    Albumin 4.50 3.50 - 5.20 g/dL    ALT (SGPT) 15 1 - 33 U/L    AST (SGOT) 16 1 - 32 U/L    Alkaline Phosphatase 77 39 - 117 U/L    Total Bilirubin 0.8 0.0 - 1.2 mg/dL    eGFR Non African Amer 63 >60 mL/min/1.73    Globulin 2.5 gm/dL    A/G Ratio 1.8 g/dL    BUN/Creatinine Ratio 13.5 7.0 - 25.0    Anion Gap 12.4 5.0 - 15.0 mmol/L   Results for orders placed or performed during the hospital encounter of 10/13/20   Tissue Pathology Exam    Specimen: A: Gastric, Antrum; Tissue    B: Esophagus, Distal; Tissue   Result Value Ref Range    Case Report       Surgical Pathology Report                         Case: [id number]                                  Authorizing Provider:  Daniel Michael MD        Collected:           10/13/2020 01:41 PM          Ordering Location:     [facility]             Received:            10/14/2020 06:58 AM                                 [facility]                                                     Pathologist:           Demar Florian MD                                                           Specimens:   1) - Gastric, Antrum, antrum bx                                                                     2) - Esophagus, Distal, distal esophagus bx                                                Final Diagnosis       SEE SCANNED REPORT       Results for orders placed or performed in visit on 10/10/20   COVID [facility] Priority - Swab, Nasopharynx    Specimen: Nasopharynx;  Swab   Result Value Ref Range    COVID [facility] PRIORITY Comment    COVID-19,[facility] ROUTINE, NP/OP SWAB IN TRANSPORT MEDIA OR ESWAB 72 HR TAT - Swab, Nasopharynx    Specimen: Nasopharynx; Swab   Result Value Ref Range    SARS-CoV-2, ROSEMARIE Not Detected Not Detected   Results for orders placed or performed during the hospital encounter of 05/19/20   SARS-CoV-2, ROSEMARIE ([facility]) - Swab, Nasopharynx    Specimen: Nasopharynx; Swab   Result Value Ref Range    SARS-CoV-2, ROSEMARIE Not Detected Not Detected   Results for orders placed or performed in visit on 03/12/20   Sedimentation rate, automated    Specimen: Blood   Result Value Ref Range    Sed Rate 8 0 - 20 mm/hr   Rheumatoid factor    Specimen: Blood   Result Value Ref Range    Rheumatoid Factor Quantitative <10.0 0.0 - 14.0 IU/mL   C-reactive protein    Specimen: Blood   Result Value Ref Range    C-Reactive Protein 0.53 (H) 0.00 - 0.50 mg/dL   KATHLEEN    Specimen: Blood   Result Value Ref Range    KATHLEEN Direct Negative Negative   Liquid-based Pap Smear, Screening    Specimen: Cervix; ThinPrep Vial   Result Value Ref Range    Case Report       Gynecologic Cytology Report                       Case: [id number]                                  Authorizing Provider:  Merle Parker APRN    Collected:           03/12/2020 04:12 PM          Ordering Location:     [facility]     Received:            03/12/2020 04:12 PM                                 [facility] Screen:          Sabrina Gomez                                                              Specimen:    Liquid-Based Pap, Screening, Cervix                                                        Interpretation Negative for intraepithelial lesion or malignancy      General Categorization Within normal limits     Specimen Adequacy Satisfactory  for evaluation     Additional Information       Disclaimer: Cervical cytology is a screening test primarily for squamous cancer and its precursors and has associated false-negative and false-positive results.  Technologies such as liquid-based preparations may decrease but will not eliminate all false-negative results.  Follow-up of unexplained clinical signs and symptoms is recommended to minimize false-negative results. (The [place] System for Reporting Cervical Cytology: [place], 2015).     Results for orders placed or performed in visit on 01/27/20   Iron and TIBC    Specimen: Blood   Result Value Ref Range    Iron 102 37 - 145 mcg/dL    Iron Saturation 25 20 - 50 %    Transferrin 277 200 - 360 mg/dL    TIBC 413 298 - 536 mcg/dL   Results for orders placed or performed in visit on 12/12/19   CBC Auto Differential    Specimen: Blood   Result Value Ref Range    WBC 7.82 3.40 - 10.80 10*3/mm3    RBC 4.90 3.77 - 5.28 10*6/mm3    Hemoglobin 10.7 (L) 12.0 - 15.9 g/dL    Hematocrit 33.0 (L) 34.0 - 46.6 %    MCV 67.3 (L) 79.0 - 97.0 fL    MCH 21.8 (L) 26.6 - 33.0 pg    MCHC 32.4 31.5 - 35.7 g/dL    RDW 15.7 (H) 12.3 - 15.4 %    RDW-SD 36.7 (L) 37.0 - 54.0 fl    MPV 10.5 6.0 - 12.0 fL    Platelets 305 140 - 450 10*3/mm3   Iron and TIBC    Specimen: Blood   Result Value Ref Range    Iron 128 37 - 145 mcg/dL    Iron Saturation 30 20 - 50 %    Transferrin 283 200 - 360 mg/dL    TIBC 422 298 - 536 mcg/dL   Manual Differential    Specimen: Blood   Result Value Ref Range    Neutrophil % 50.5 42.7 - 76.0 %    Lymphocyte % 40.4 19.6 - 45.3 %    Monocyte % 9.1 5.0 - 12.0 %    Neutrophils Absolute 3.95 1.70 - 7.00 10*3/mm3    Lymphocytes Absolute 3.16 (H) 0.70 - 3.10 10*3/mm3    Monocytes Absolute 0.71 0.10 - 0.90 10*3/mm3    nRBC 2.0 (H) 0.0 - 0.2 /100 WBC    Anisocytosis Mod/2+ None Seen    Microcytes Mod/2+ None Seen    Poikilocytes Slight/1+ None Seen    Target Cells Slight/1+ None Seen    WBC Morphology Normal Normal     Platelet Morphology Normal Normal     *Note: Due to a large number of results and/or encounters for the requested time period, some results have not been displayed. A complete set of results can be found in Results Review.

## 2021-12-06 ENCOUNTER — OFFICE VISIT (OUTPATIENT)
Dept: FAMILY MEDICINE CLINIC | Facility: CLINIC | Age: 71
End: 2021-12-06

## 2021-12-06 VITALS
WEIGHT: 208 LBS | SYSTOLIC BLOOD PRESSURE: 120 MMHG | OXYGEN SATURATION: 98 % | HEIGHT: 65 IN | HEART RATE: 103 BPM | DIASTOLIC BLOOD PRESSURE: 86 MMHG | TEMPERATURE: 96.4 F | BODY MASS INDEX: 34.66 KG/M2

## 2021-12-06 DIAGNOSIS — Z23 FLU VACCINE NEED: ICD-10-CM

## 2021-12-06 DIAGNOSIS — M79.7 FIBROMYALGIA: Primary | ICD-10-CM

## 2021-12-06 PROCEDURE — G0008 ADMIN INFLUENZA VIRUS VAC: HCPCS | Performed by: STUDENT IN AN ORGANIZED HEALTH CARE EDUCATION/TRAINING PROGRAM

## 2021-12-06 PROCEDURE — 99214 OFFICE O/P EST MOD 30 MIN: CPT | Performed by: STUDENT IN AN ORGANIZED HEALTH CARE EDUCATION/TRAINING PROGRAM

## 2021-12-06 PROCEDURE — 90662 IIV NO PRSV INCREASED AG IM: CPT | Performed by: STUDENT IN AN ORGANIZED HEALTH CARE EDUCATION/TRAINING PROGRAM

## 2021-12-06 RX ORDER — DULOXETIN HYDROCHLORIDE 60 MG/1
60 CAPSULE, DELAYED RELEASE ORAL DAILY
Qty: 90 CAPSULE | Refills: 1 | Status: SHIPPED | OUTPATIENT
Start: 2021-12-06 | End: 2022-09-12 | Stop reason: SDUPTHER

## 2021-12-06 RX ORDER — GABAPENTIN 300 MG/1
300 CAPSULE ORAL NIGHTLY
Qty: 30 CAPSULE | Refills: 2 | Status: SHIPPED | OUTPATIENT
Start: 2021-12-06 | End: 2022-03-07 | Stop reason: SDUPTHER

## 2021-12-13 ENCOUNTER — LAB (OUTPATIENT)
Dept: LAB | Facility: HOSPITAL | Age: 71
End: 2021-12-13

## 2021-12-13 DIAGNOSIS — D64.9 CHRONIC ANEMIA: Primary | ICD-10-CM

## 2021-12-13 LAB — HEMOCCULT STL QL IA: NEGATIVE

## 2021-12-13 PROCEDURE — 82272 OCCULT BLD FECES 1-3 TESTS: CPT

## 2021-12-14 LAB
COLLECT DATE SP2 STL: NORMAL
COLLECT DATE SP3 STL: NORMAL
COLLECT DATE STL: NORMAL
GASTROCULT GAST QL: NEGATIVE
HEMOCCULT SP2 STL QL: NEGATIVE
HEMOCCULT SP3 STL QL: NEGATIVE

## 2022-01-21 RX ORDER — OMEPRAZOLE 40 MG/1
CAPSULE, DELAYED RELEASE ORAL
Qty: 90 CAPSULE | Refills: 0 | Status: SHIPPED | OUTPATIENT
Start: 2022-01-21 | End: 2022-07-05

## 2022-01-24 NOTE — TELEPHONE ENCOUNTER
Rx Refill Note  Requested Prescriptions     Pending Prescriptions Disp Refills   • tiZANidine (ZANAFLEX) 4 MG tablet 90 tablet 3     Sig: Take 1 tablet by mouth Every 8 (Eight) Hours As Needed for Muscle Spasms.      Last office visit with prescribing clinician: 12/6/2021      Next office visit with prescribing clinician: 3/7/2022            Mary Conner Rep  01/24/22, 14:14 CST

## 2022-01-26 RX ORDER — TIZANIDINE 4 MG/1
4 TABLET ORAL EVERY 8 HOURS PRN
Qty: 90 TABLET | Refills: 3 | Status: SHIPPED | OUTPATIENT
Start: 2022-01-26 | End: 2022-06-09

## 2022-01-27 DIAGNOSIS — G47.00 INSOMNIA, UNSPECIFIED TYPE: ICD-10-CM

## 2022-01-27 RX ORDER — TRAZODONE HYDROCHLORIDE 50 MG/1
TABLET ORAL
Qty: 60 TABLET | Refills: 1 | Status: SHIPPED | OUTPATIENT
Start: 2022-01-27 | End: 2022-03-07 | Stop reason: SDUPTHER

## 2022-03-07 ENCOUNTER — OFFICE VISIT (OUTPATIENT)
Dept: FAMILY MEDICINE CLINIC | Facility: CLINIC | Age: 72
End: 2022-03-07

## 2022-03-07 VITALS
OXYGEN SATURATION: 96 % | HEIGHT: 65 IN | TEMPERATURE: 98.2 F | WEIGHT: 218 LBS | SYSTOLIC BLOOD PRESSURE: 122 MMHG | DIASTOLIC BLOOD PRESSURE: 88 MMHG | BODY MASS INDEX: 36.32 KG/M2 | HEART RATE: 107 BPM

## 2022-03-07 DIAGNOSIS — G47.00 INSOMNIA, UNSPECIFIED TYPE: ICD-10-CM

## 2022-03-07 DIAGNOSIS — M79.7 FIBROMYALGIA: Primary | ICD-10-CM

## 2022-03-07 DIAGNOSIS — Z91.89 AT RISK FOR OBSTRUCTIVE SLEEP APNEA: ICD-10-CM

## 2022-03-07 PROCEDURE — 99214 OFFICE O/P EST MOD 30 MIN: CPT | Performed by: STUDENT IN AN ORGANIZED HEALTH CARE EDUCATION/TRAINING PROGRAM

## 2022-03-07 RX ORDER — TRAZODONE HYDROCHLORIDE 50 MG/1
50 TABLET ORAL NIGHTLY
Qty: 90 TABLET | Refills: 1 | Status: SHIPPED | OUTPATIENT
Start: 2022-03-07 | End: 2022-03-11

## 2022-03-07 RX ORDER — GABAPENTIN 300 MG/1
300 CAPSULE ORAL NIGHTLY
Qty: 90 CAPSULE | Refills: 0 | Status: SHIPPED | OUTPATIENT
Start: 2022-03-07 | End: 2022-06-07 | Stop reason: SDUPTHER

## 2022-03-07 NOTE — PROGRESS NOTES
"Subjective:  Sofía Clarke is a 71 y.o. female who presents for Medication refills.    Fibromyalgia; currently on gabapentin 300 mg nightly, ibuprofen 200 mg every 6 hours as needed, Zanaflex 4 mg 3 times daily as needed Cymbalta 60 mg daily.  No issues with medications, providing good relief.  Does admit to fatigue however states did not get sleep study, does not have restful sleep.  No acute complaints today, no sedation, increased use of medications, SI/HI/AV hallucinations.      Patient Active Problem List   Diagnosis   • Morbidly obese (HCC)   • Gastroesophageal reflux disease   • Nausea   • Dysphagia   • Multiple food allergies   • Fibromyalgia     Vitals:    Vitals:    03/07/22 1024   BP: 122/88   BP Location: Right arm   Patient Position: Sitting   Cuff Size: Adult   Pulse: 107   Temp: 98.2 °F (36.8 °C)   SpO2: 96%   Weight: 98.9 kg (218 lb)   Height: 165.1 cm (65\")     Body mass index is 36.28 kg/m².      Current Outpatient Medications:   •  acetaminophen (TYLENOL) 500 MG tablet, Take 500 mg by mouth As Needed for Mild Pain ., Disp: , Rfl:   •  ALPHA LIPOIC ACID PO, Take 1 tablet by mouth Daily., Disp: , Rfl:   •  Cyanocobalamin (B-12) 1000 MCG capsule, Take 1 tablet by mouth Daily., Disp: , Rfl:   •  DULoxetine (CYMBALTA) 60 MG capsule, Take 1 capsule by mouth Daily., Disp: 90 capsule, Rfl: 1  •  folic acid (FOLVITE) 400 MCG tablet, Take 400 mcg by mouth Daily., Disp: , Rfl:   •  gabapentin (NEURONTIN) 300 MG capsule, Take 1 capsule by mouth Every Night., Disp: 90 capsule, Rfl: 0  •  omeprazole (priLOSEC) 40 MG capsule, TAKE 1 CAPSULE EVERY DAY, Disp: 90 capsule, Rfl: 0  •  Probiotic Product (PROBIOTIC PO), Take 1 capsule by mouth Daily., Disp: , Rfl:   •  sucralfate (CARAFATE) 1 g tablet, TAKE 1 TABLET BY MOUTH EVERY NIGHT AT BEDTIME., Disp: 90 tablet, Rfl: 1  •  tiZANidine (ZANAFLEX) 4 MG tablet, Take 1 tablet by mouth Every 8 (Eight) Hours As Needed for Muscle Spasms., Disp: 90 tablet, Rfl: 3  •  " traZODone (DESYREL) 50 MG tablet, Take 1 tablet by mouth Every Night., Disp: 90 tablet, Rfl: 1  •  vitamin B-6 (PYRIDOXINE) 50 MG tablet, Take 25 mg by mouth Daily., Disp: , Rfl:   •  VITAMIN D PO, Take 1 tablet by mouth Daily., Disp: , Rfl:   •  ibuprofen (ADVIL,MOTRIN) 200 MG tablet, Take 200 mg by mouth Every 6 (Six) Hours As Needed for Mild Pain ., Disp: , Rfl:     Patient Active Problem List   Diagnosis   • Morbidly obese (HCC)   • Gastroesophageal reflux disease   • Nausea   • Dysphagia   • Multiple food allergies   • Fibromyalgia     Past Surgical History:   Procedure Laterality Date   • CARDIAC ABLATION     • CARDIAC CATHETERIZATION     • CARDIAC SURGERY  2000    cath ablation-vein   • COLONOSCOPY  2010/2018   • ENDOSCOPY N/A 10/13/2020    Procedure: ESOPHAGOGASTRODUODENOSCOPY WITH possible DILATATION--bx;  Surgeon: Daniel Michael MD;  Location: [facility] ENDOSCOPY;  Service: Gastroenterology;  Laterality: N/A;   • WISDOM TOOTH EXTRACTION       Social History     Socioeconomic History   • Marital status:    Tobacco Use   • Smoking status: Never Smoker   • Smokeless tobacco: Never Used   Vaping Use   • Vaping Use: Never used   Substance and Sexual Activity   • Alcohol use: Never   • Drug use: Never   • Sexual activity: Defer     Family History   Problem Relation Age of Onset   • Emphysema Mother    • Dementia Mother    • Colon polyps Mother    • Diabetes Father    • Cancer Father    • No Known Problems Sister    • Hypertension Brother    • Diabetes Paternal Grandmother    • Cancer Paternal Grandfather    • Cancer Brother      Lab on 12/13/2021   Component Date Value Ref Range Status   • Occult Blood, Fecal by Immunoassay 12/13/2021 Negative  Negative Final   • Occult Blood 12/13/2021 Negative  Negative Final   • Occult Blood 2 12/13/2021 Negative  Negative Final   • Occult Blood 3 12/13/2021 Negative  Negative Final   • OB Date 1 12/13/2021 12/7/2021   Final   • OB Date 2 12/13/2021 12/8/2021   Final    • OB Date 3 12/13/2021 12/10/2021   Final      FL Esophagram Complete  Narrative: PROCEDURE: Barium swallow    HISTORY:  Dysphagia     COMPARISON:  None    Fluoroscopy time was one minute and 37 seconds.  Total # of films = 55     TECHNIQUE:    Mucosal relief views were obtained in the LPO position.  Subsequently, a double contrast esophagram was performed using  effervescent granules followed by thick barium. The esophagus was  evaluated in the LPO position with spot fluoroscopic images  obtained. The single column portion of the exam was performed in  the prone, FERGUSON position.    FINDINGS:  No esophageal filling defect, stricture or mucosal abnormality.  The gastroesophageal junction distends appropriately. There is  normal transit of barium. A moderate-sized hiatal hernia is  present. Gastroesophageal reflux was observed.    The patient was given a 12.5 mm barium tablet which passed  through the esophagus and into the stomach without delay.   Impression: CONCLUSION:    Moderate sized hiatal hernia.  Gastroesophageal reflux.    Electronically signed by:  Tim Ashley MD  5/19/2020 2:32 PM CDT  Workstation: [id number]      @[email or web address]@  Immunization History   Administered Date(s) Administered   • COVID-19 (MODERNA) 1st, 2nd, 3rd Dose Only 09/13/2021, 10/11/2021   • Fluzone High-Dose 65+yrs 12/06/2021   • Shingrix 07/04/2021   • Tdap 08/12/2017     The following portions of the patient's history were reviewed and updated as appropriate: allergies, current medications, past family history, past medical history, past social history, past surgical history and problem list.    PHQ-9 Total Score: 17           Physical Exam  Constitutional:       Appearance: Normal appearance.   HENT:      Head: Normocephalic and atraumatic.      Right Ear: External ear normal.      Left Ear: External ear normal.   Eyes:      General:         Right eye: No discharge.         Left eye: No discharge.      Conjunctiva/sclera: Conjunctivae  normal.   Cardiovascular:      Rate and Rhythm: Normal rate and regular rhythm.      Pulses: Normal pulses.      Heart sounds: Normal heart sounds. No murmur heard.  Pulmonary:      Effort: Pulmonary effort is normal. No respiratory distress.      Breath sounds: Normal breath sounds.   Abdominal:      General: There is no distension.      Palpations: Abdomen is soft.      Tenderness: There is no abdominal tenderness.   Musculoskeletal:      Cervical back: Normal range of motion.      Right lower leg: No edema.      Left lower leg: No edema.   Lymphadenopathy:      Cervical: No cervical adenopathy.   Neurological:      Mental Status: She is alert. Mental status is at baseline.   Psychiatric:         Mood and Affect: Mood normal.         Behavior: Behavior normal.       Assessment/Plan    Diagnosis Plan   1. Fibromyalgia  gabapentin (NEURONTIN) 300 MG capsule   2. Insomnia, unspecified type  traZODone (DESYREL) 50 MG tablet    Ambulatory Referral to Sleep Medicine   3. At risk for obstructive sleep apnea  Ambulatory Referral to Sleep Medicine      Orders Placed This Encounter   Procedures   • Ambulatory Referral to Sleep Medicine     Referral Priority:   Routine     Number of Visits Requested:   1     Fibromyalgia; doing well with current medications, does endorse fatigue however likely due to sleep disturbance, will refer to sleep medicine, continue trazodone as needed.  No red flags on exam, strict ER/return precaution given.  Dwaine reviewed and appropriate, no signs of abuse, misuse.  Follow-up in 3 months, sooner if needed.          This document has been electronically signed by Tone Simms MD on March 7, 2022 15:33 CST    EMR Dragon/Transciption Disclaimer: Some of this note may be an electronic transcription/translation of spoken language to printed text.  The electronic translation of spoken language may permit erroneous, or at times, nonsensical words or phrases to be inadvertently transcribed. Although I  have reviewed the note for such errors, some may still exist.

## 2022-03-10 DIAGNOSIS — G47.00 INSOMNIA, UNSPECIFIED TYPE: ICD-10-CM

## 2022-03-10 NOTE — TELEPHONE ENCOUNTER
Rx Refill Note  Requested Prescriptions     Pending Prescriptions Disp Refills   • traZODone (DESYREL) 50 MG tablet [Pharmacy Med Name: TRAZODONE HYDROCHLORIDE 50 MG Tablet] 90 tablet 1     Sig: TAKE 1 TABLET EVERY NIGHT (NEED APPT AND LABS FOR FURTHER REFILLS)      Last office visit with prescribing clinician: 3/7/2022      Next office visit with prescribing clinician: 4/25/2022            Mary Conner Rep  03/10/22, 14:56 CST

## 2022-03-11 RX ORDER — TRAZODONE HYDROCHLORIDE 50 MG/1
TABLET ORAL
Qty: 90 TABLET | Refills: 1 | Status: SHIPPED | OUTPATIENT
Start: 2022-03-11 | End: 2022-07-25 | Stop reason: SDUPTHER

## 2022-03-11 RX ORDER — SUCRALFATE 1 G/1
1 TABLET ORAL
Qty: 1 TABLET | Refills: 0 | Status: SHIPPED | OUTPATIENT
Start: 2022-03-11 | End: 2022-08-03 | Stop reason: SDUPTHER

## 2022-03-25 ENCOUNTER — TELEPHONE (OUTPATIENT)
Dept: GASTROENTEROLOGY | Facility: CLINIC | Age: 72
End: 2022-03-25

## 2022-03-25 NOTE — TELEPHONE ENCOUNTER
Patient states that she has a full script of Sucralfate on hand and does not currently need a refill. She states that she will call our office back to schedule an appointment.

## 2022-03-25 NOTE — TELEPHONE ENCOUNTER
----- Message from Monty Stringer PA-C sent at 3/22/2022  4:51 PM CDT -----  EDWAR, but she does need a follow up  ----- Message -----  From: Lori Cifuentes MA  Sent: 3/22/2022   3:45 PM CDT  To: Monty Stringer PA-C    Did you mean to send in 1 tablet as a refill or did you mean to decline the refill altogether since she does not have a follow up?

## 2022-04-06 RX ORDER — OMEPRAZOLE 40 MG/1
CAPSULE, DELAYED RELEASE ORAL
Qty: 1 CAPSULE | Refills: 0 | OUTPATIENT
Start: 2022-04-06

## 2022-04-11 NOTE — TELEPHONE ENCOUNTER
Deferred to Dr Winters   [Time Spent: ___ minutes] : I have spent [unfilled] minutes of time on the encounter.

## 2022-04-25 ENCOUNTER — OFFICE VISIT (OUTPATIENT)
Dept: FAMILY MEDICINE CLINIC | Facility: CLINIC | Age: 72
End: 2022-04-25

## 2022-04-25 VITALS
HEART RATE: 96 BPM | HEIGHT: 65 IN | TEMPERATURE: 97.1 F | OXYGEN SATURATION: 99 % | SYSTOLIC BLOOD PRESSURE: 118 MMHG | BODY MASS INDEX: 37.99 KG/M2 | DIASTOLIC BLOOD PRESSURE: 86 MMHG | WEIGHT: 228 LBS

## 2022-04-25 DIAGNOSIS — M25.542 ARTHRALGIA OF BOTH HANDS: ICD-10-CM

## 2022-04-25 DIAGNOSIS — Z00.00 MEDICARE ANNUAL WELLNESS VISIT, SUBSEQUENT: Primary | ICD-10-CM

## 2022-04-25 DIAGNOSIS — M25.541 ARTHRALGIA OF BOTH HANDS: ICD-10-CM

## 2022-04-25 PROCEDURE — 1170F FXNL STATUS ASSESSED: CPT | Performed by: STUDENT IN AN ORGANIZED HEALTH CARE EDUCATION/TRAINING PROGRAM

## 2022-04-25 PROCEDURE — 1159F MED LIST DOCD IN RCRD: CPT | Performed by: STUDENT IN AN ORGANIZED HEALTH CARE EDUCATION/TRAINING PROGRAM

## 2022-04-25 PROCEDURE — G0439 PPPS, SUBSEQ VISIT: HCPCS | Performed by: STUDENT IN AN ORGANIZED HEALTH CARE EDUCATION/TRAINING PROGRAM

## 2022-04-25 NOTE — PROGRESS NOTES
The ABCs of the Annual Wellness Visit  Subsequent Medicare Wellness Visit    Chief Complaint   Patient presents with   • Medicare Wellness-subsequent      Subjective    History of Present Illness:  Sofía Clarke is a 71 y.o. female who presents for a Subsequent Medicare Wellness Visit.    The following portions of the patient's history were reviewed and   updated as appropriate: allergies, current medications, past family history, past medical history, past social history, past surgical history and problem list.    Compared to one year ago, the patient feels her physical   health is worse.    Compared to one year ago, the patient feels her mental   health is better.    Recent Hospitalizations:  She was not admitted to the hospital during the last year.       Current Medical Providers:  Patient Care Team:  Tone Simms MD as PCP - General (General Practice)    Outpatient Medications Prior to Visit   Medication Sig Dispense Refill   • acetaminophen (TYLENOL) 500 MG tablet Take 500 mg by mouth As Needed for Mild Pain .     • ALPHA LIPOIC ACID PO Take 1 tablet by mouth Daily.     • Cyanocobalamin (B-12) 1000 MCG capsule Take 1 tablet by mouth Daily.     • DULoxetine (CYMBALTA) 60 MG capsule Take 1 capsule by mouth Daily. 90 capsule 1   • folic acid (FOLVITE) 400 MCG tablet Take 400 mcg by mouth Daily.     • gabapentin (NEURONTIN) 300 MG capsule Take 1 capsule by mouth Every Night. 90 capsule 0   • omeprazole (priLOSEC) 40 MG capsule TAKE 1 CAPSULE EVERY DAY 90 capsule 0   • Probiotic Product (PROBIOTIC PO) Take 1 capsule by mouth Daily.     • sucralfate (CARAFATE) 1 g tablet TAKE 1 TABLET BY MOUTH EVERY NIGHT AT BEDTIME. 1 tablet 0   • tiZANidine (ZANAFLEX) 4 MG tablet Take 1 tablet by mouth Every 8 (Eight) Hours As Needed for Muscle Spasms. 90 tablet 3   • traZODone (DESYREL) 50 MG tablet TAKE 1 TABLET EVERY NIGHT (NEED APPT AND LABS FOR FURTHER REFILLS) 90 tablet 1   • vitamin B-6 (PYRIDOXINE) 50 MG tablet  "Take 25 mg by mouth Daily.     • VITAMIN D PO Take 1 tablet by mouth Daily.     • ibuprofen (ADVIL,MOTRIN) 200 MG tablet Take 200 mg by mouth Every 6 (Six) Hours As Needed for Mild Pain .       No facility-administered medications prior to visit.       No opioid medication identified on active medication list. I have reviewed chart for other potential  high risk medication/s and harmful drug interactions in the elderly.          Aspirin is not on active medication list.  Aspirin use is not indicated based on review of current medical condition/s. Risk of harm outweighs potential benefits.  .    Patient Active Problem List   Diagnosis   • Morbidly obese (HCC)   • Gastroesophageal reflux disease   • Nausea   • Dysphagia   • Multiple food allergies   • Fibromyalgia     Advance Care Planning  Advance Directive is not on file.  ACP discussion was declined by the patient. Patient does not have an advance directive, information provided.          Objective    Vitals:    04/25/22 1012   BP: 118/86   BP Location: Left arm   Patient Position: Sitting   Cuff Size: Large Adult   Pulse: 96   Temp: 97.1 °F (36.2 °C)   SpO2: 99%   Weight: 103 kg (228 lb)   Height: 165.1 cm (65\")   PainSc: 0-No pain     BMI Readings from Last 1 Encounters:   04/25/22 37.94 kg/m²   BMI is above normal parameters. Recommendations include: educational material    Does the patient have evidence of cognitive impairment? No    Physical Exam            HEALTH RISK ASSESSMENT    Smoking Status:  Social History     Tobacco Use   Smoking Status Never Smoker   Smokeless Tobacco Never Used     Alcohol Consumption:  Social History     Substance and Sexual Activity   Alcohol Use Never     Fall Risk Screen:    STEADI Fall Risk Assessment was completed, and patient is at LOW risk for falls.Assessment completed on:4/25/2022    Depression Screening:  PHQ-2/PHQ-9 Depression Screening 4/25/2022   Retired PHQ-9 Total Score -   Retired Total Score -   Little Interest or " Pleasure in Doing Things 0-->not at all   Feeling Down, Depressed or Hopeless 3-->nearly every day   Trouble Falling or Staying Asleep, or Sleeping Too Much 3-->nearly every day   Feeling Tired or Having Little Energy 3-->nearly every day   Poor Appetite or Overeating 0-->not at all   Feeling Bad about Yourself - or that You are a Failure or Have Let Yourself or Your Family Down 1-->several days   Trouble Concentrating on Things, Such as Reading the Newspaper or Watching Television 3-->nearly every day   Moving or Speaking So Slowly that Other People Could Have Noticed? Or the Opposite - Being So Fidgety 0-->not at all   Thoughts that You Would be Better Off Dead or of Hurting Yourself in Some Way 0-->not at all   PHQ-9: Brief Depression Severity Measure Score 13   If You Checked Off Any Problems, How Difficult Have These Problems Made It For You to Do Your Work, Take Care of Things at Home, or Get Along with Other People? somewhat difficult       Health Habits and Functional and Cognitive Screening:  Functional & Cognitive Status 4/25/2022   Do you have difficulty preparing food and eating? No   Do you have difficulty bathing yourself, getting dressed or grooming yourself? No   Do you have difficulty using the toilet? No   Do you have difficulty moving around from place to place? No   Do you have trouble with steps or getting out of a bed or a chair? No   Current Diet Well Balanced Diet   Dental Exam Up to date   Eye Exam Not up to date   Exercise (times per week) 0 times per week   Current Exercises Include No Regular Exercise   Current Exercise Activities Include -   Do you need help using the phone?  No   Are you deaf or do you have serious difficulty hearing?  No   Do you need help with transportation? No   Do you need help shopping? No   Do you need help preparing meals?  No   Do you need help with housework?  Yes   Do you need help with laundry? No   Do you need help taking your medications? No   Do you need  help managing money? No   Do you ever drive or ride in a car without wearing a seat belt? No   Have you felt unusual stress, anger or loneliness in the last month? Yes   Who do you live with? Child   If you need help, do you have trouble finding someone available to you? No   Have you been bothered in the last four weeks by sexual problems? No   Do you have difficulty concentrating, remembering or making decisions? Yes       Age-appropriate Screening Schedule:  Refer to the list below for future screening recommendations based on patient's age, sex and/or medical conditions. Orders for these recommended tests are listed in the plan section. The patient has been provided with a written plan.    Health Maintenance   Topic Date Due   • MAMMOGRAM  06/30/2022   • INFLUENZA VACCINE  08/01/2022   • DXA SCAN  09/24/2023   • TDAP/TD VACCINES (2 - Td or Tdap) 08/12/2027   • ZOSTER VACCINE  Discontinued              Assessment/Plan   CMS Preventative Services Quick Reference  Risk Factors Identified During Encounter  Inactivity/Sedentary  Polypharmacy  The above risks/problems have been discussed with the patient.  Follow up actions/plans if indicated are seen below in the Assessment/Plan Section.  Pertinent information has been shared with the patient in the After Visit Summary.    Diagnoses and all orders for this visit:    1. Medicare annual wellness visit, subsequent (Primary)    2. Arthralgia of both hands  -     Uric acid; Future  -     Antistreptolysin O screen; Future  -     Rheumatoid factor; Future  -     C-reactive protein; Future  -     KATHLEEN; Future        Follow Up:   No follow-ups on file.     An After Visit Summary and PPPS were made available to the patient.    .

## 2022-04-25 NOTE — PATIENT INSTRUCTIONS
Medicare Wellness  Personal Prevention Plan of Service     Date of Office Visit:    Encounter Provider:  Tone Simms MD  Place of Service:  [facility]  Patient Name: Sofía Clarke  :  1950    As part of the Medicare Wellness portion of your visit today, we are providing you with this personalized preventive plan of services (PPPS). This plan is based upon recommendations of the [place] Preventive Services Task Force (USPSTF) and the Advisory Committee on Immunization Practices (ACIP).    This lists the preventive care services that should be considered, and provides dates of when you are due. Items listed as completed are up-to-date and do not require any further intervention.    Health Maintenance   Topic Date Due    COVID-19 Vaccine (3 - Booster for Moderna series) 2022 (Originally 3/11/2022)    MAMMOGRAM  2022    INFLUENZA VACCINE  2022    ANNUAL WELLNESS VISIT  2023    DXA SCAN  2023    TDAP/TD VACCINES (2 - Td or Tdap) 2027    COLORECTAL CANCER SCREENING  02/15/2029    HEPATITIS C SCREENING  Completed    Pneumococcal Vaccine 65+  Completed    ZOSTER VACCINE  Discontinued       No orders of the defined types were placed in this encounter.      No follow-ups on file.

## 2022-05-12 ENCOUNTER — LAB (OUTPATIENT)
Dept: LAB | Facility: HOSPITAL | Age: 72
End: 2022-05-12

## 2022-05-12 DIAGNOSIS — M25.541 ARTHRALGIA OF BOTH HANDS: ICD-10-CM

## 2022-05-12 DIAGNOSIS — M25.542 ARTHRALGIA OF BOTH HANDS: ICD-10-CM

## 2022-05-12 PROCEDURE — 86431 RHEUMATOID FACTOR QUANT: CPT

## 2022-05-12 PROCEDURE — 86225 DNA ANTIBODY NATIVE: CPT

## 2022-05-12 PROCEDURE — 86063 ANTISTREPTOLYSIN O SCREEN: CPT

## 2022-05-12 PROCEDURE — 86038 ANTINUCLEAR ANTIBODIES: CPT

## 2022-05-12 PROCEDURE — 84550 ASSAY OF BLOOD/URIC ACID: CPT

## 2022-05-12 PROCEDURE — 86140 C-REACTIVE PROTEIN: CPT

## 2022-05-13 LAB
CHROMATIN AB SERPL-ACNC: <10 IU/ML (ref 0–14)
CRP SERPL-MCNC: 0.37 MG/DL (ref 0–0.5)
URATE SERPL-MCNC: 5.7 MG/DL (ref 2.4–5.7)

## 2022-05-14 LAB — ASO AB SERPL-ACNC: NEGATIVE [IU]/ML

## 2022-05-16 LAB
ANA SER QL: POSITIVE
DSDNA AB SER-ACNC: <1 IU/ML (ref 0–9)
Lab: NORMAL

## 2022-05-25 DIAGNOSIS — M25.542 ARTHRALGIA OF BOTH HANDS: ICD-10-CM

## 2022-05-25 DIAGNOSIS — M25.541 ARTHRALGIA OF BOTH HANDS: ICD-10-CM

## 2022-05-25 DIAGNOSIS — R76.8 ANA POSITIVE: Primary | ICD-10-CM

## 2022-05-25 NOTE — PROGRESS NOTES
Non-specific marker for autoimmune disease present. Remainder of labs negative. Unclear if clinically significant, recommend rheumatology referral for further assessment if desired.

## 2022-06-07 ENCOUNTER — OFFICE VISIT (OUTPATIENT)
Dept: FAMILY MEDICINE CLINIC | Facility: CLINIC | Age: 72
End: 2022-06-07

## 2022-06-07 VITALS
WEIGHT: 228.2 LBS | HEART RATE: 108 BPM | DIASTOLIC BLOOD PRESSURE: 84 MMHG | OXYGEN SATURATION: 97 % | BODY MASS INDEX: 38.02 KG/M2 | TEMPERATURE: 98 F | SYSTOLIC BLOOD PRESSURE: 118 MMHG | HEIGHT: 65 IN

## 2022-06-07 DIAGNOSIS — M79.7 FIBROMYALGIA: ICD-10-CM

## 2022-06-07 PROCEDURE — 99214 OFFICE O/P EST MOD 30 MIN: CPT | Performed by: STUDENT IN AN ORGANIZED HEALTH CARE EDUCATION/TRAINING PROGRAM

## 2022-06-07 RX ORDER — GABAPENTIN 300 MG/1
300 CAPSULE ORAL NIGHTLY
Qty: 90 CAPSULE | Refills: 0 | Status: SHIPPED | OUTPATIENT
Start: 2022-06-07 | End: 2022-09-12 | Stop reason: SDUPTHER

## 2022-06-07 NOTE — PROGRESS NOTES
"Subjective:  Sofía Clarke is a 72 y.o. female who presents for     Fibromyalgia; patient currently on gabapentin 3 mg nightly, Cymbalta 60 mg daily.  Additionally, takes ibuprofen 200 mg 4 times daily as needed and Zanaflex 4 mg 3 times daily as needed.  No acute complaints today, states symptoms wax and wane but overall feels well.  Denied any SI/HI/A/V hallucinations, depression, anxiety.  Of note, recently seen hematology, diagnosed with beta thalassemia, currently awaiting rheumatology appointment to address positive KATHLEEN.  Denied any joint swelling, fever, chills, weight loss.    Patient Active Problem List   Diagnosis   • Morbidly obese (HCC)   • Gastroesophageal reflux disease   • Nausea   • Dysphagia   • Multiple food allergies   • Fibromyalgia     Vitals:    Vitals:    06/07/22 1017   BP: 118/84   BP Location: Left arm   Patient Position: Sitting   Cuff Size: Large Adult   Pulse: 108   Temp: 98 °F (36.7 °C)   SpO2: 97%   Weight: 104 kg (228 lb 3.2 oz)   Height: 165.1 cm (65\")     Body mass index is 37.97 kg/m².      Current Outpatient Medications:   •  acetaminophen (TYLENOL) 500 MG tablet, Take 500 mg by mouth As Needed for Mild Pain ., Disp: , Rfl:   •  ALPHA LIPOIC ACID PO, Take 1 tablet by mouth Daily., Disp: , Rfl:   •  Cyanocobalamin (B-12) 1000 MCG capsule, Take 1 tablet by mouth Daily., Disp: , Rfl:   •  DULoxetine (CYMBALTA) 60 MG capsule, Take 1 capsule by mouth Daily., Disp: 90 capsule, Rfl: 1  •  folic acid (FOLVITE) 400 MCG tablet, Take 400 mcg by mouth Daily., Disp: , Rfl:   •  gabapentin (NEURONTIN) 300 MG capsule, Take 1 capsule by mouth Every Night., Disp: 90 capsule, Rfl: 0  •  omeprazole (priLOSEC) 40 MG capsule, TAKE 1 CAPSULE EVERY DAY, Disp: 90 capsule, Rfl: 0  •  Probiotic Product (PROBIOTIC PO), Take 1 capsule by mouth Daily., Disp: , Rfl:   •  sucralfate (CARAFATE) 1 g tablet, TAKE 1 TABLET BY MOUTH EVERY NIGHT AT BEDTIME., Disp: 1 tablet, Rfl: 0  •  tiZANidine (ZANAFLEX) 4 " MG tablet, Take 1 tablet by mouth Every 8 (Eight) Hours As Needed for Muscle Spasms., Disp: 90 tablet, Rfl: 3  •  traZODone (DESYREL) 50 MG tablet, TAKE 1 TABLET EVERY NIGHT (NEED APPT AND LABS FOR FURTHER REFILLS), Disp: 90 tablet, Rfl: 1  •  vitamin B-6 (PYRIDOXINE) 50 MG tablet, Take 25 mg by mouth Daily., Disp: , Rfl:   •  VITAMIN D PO, Take 1 tablet by mouth Daily., Disp: , Rfl:     Patient Active Problem List   Diagnosis   • Morbidly obese (HCC)   • Gastroesophageal reflux disease   • Nausea   • Dysphagia   • Multiple food allergies   • Fibromyalgia     Past Surgical History:   Procedure Laterality Date   • CARDIAC ABLATION     • CARDIAC CATHETERIZATION     • CARDIAC SURGERY  2000    cath ablation-vein   • COLONOSCOPY  2010/2018   • ENDOSCOPY N/A 10/13/2020    Procedure: ESOPHAGOGASTRODUODENOSCOPY WITH possible DILATATION--bx;  Surgeon: Daniel Michael MD;  Location: [facility] ENDOSCOPY;  Service: Gastroenterology;  Laterality: N/A;   • WISDOM TOOTH EXTRACTION       Social History     Socioeconomic History   • Marital status:    Tobacco Use   • Smoking status: Never Smoker   • Smokeless tobacco: Never Used   Vaping Use   • Vaping Use: Never used   Substance and Sexual Activity   • Alcohol use: Never   • Drug use: Never   • Sexual activity: Not Currently     Birth control/protection: Post-menopausal     Family History   Problem Relation Age of Onset   • Emphysema Mother    • Dementia Mother    • Colon polyps Mother    • Cancer Mother         Colon   • COPD Mother         Emphysema   • Depression Mother         OCD   • Diabetes Father    • Cancer Father         Lung   • Hyperlipidemia Father    • No Known Problems Sister    • Hypertension Brother    • Diabetes Paternal Grandmother    • Hyperlipidemia Paternal Grandmother    • Hypertension Paternal Grandmother    • Cancer Paternal Grandfather         Lung   • Cancer Brother      Lab on 05/12/2022   Component Date Value Ref Range Status   • Uric Acid  2022 5.7  2.4 - 5.7 mg/dL Final   • ASO 2022 Negative  Negative Final   • Rheumatoid Factor Quantitative 2022 <10.0  0.0 - 14.0 IU/mL Final   • C-Reactive Protein 2022 0.37  0.00 - 0.50 mg/dL Final   • KATHLEEN Direct 2022 Positive (A) Negative Final   • Anti-DNA (DS) Ab Qn 2022 <1  0 - 9 IU/mL Final                                       Negative      <5                                     Equivocal  5 - 9                                     Positive      >9   • See below: 2022 Comment   Final    Comment: Autoantibody                       Disease Association  ------------------------------------------------------------                          Condition                  Frequency  ---------------------   ------------------------   ---------  Antinuclear Antibody,    SLE, mixed connective  Direct (KATHLEEN-D)           tissue diseases  ---------------------   ------------------------   ---------  dsDNA                    SLE                        40 - 60%  ---------------------   ------------------------   ---------  Chromatin                Drug induced SLE                90%                           SLE                        48 - 97%  ---------------------   ------------------------   ---------  SSA (Ro)                 SLE                        25 - 35%                           Sjogren's Syndrome         40 - 70%                            Lupus                 100%  ---------------------   ------------------------   ---------  SSB (La)                 SLE                                                        10%                           Sjogren's Syndrome              30%  ---------------------   -----------------------    ---------  Sm (anti-Smith)          SLE                        15 - 30%  ---------------------   -----------------------    ---------  RNP                      Mixed Connective Tissue                           Disease                          95%  (U1 nRNP,                SLE                        30 - 50%  anti-ribonucleoprotein)  Polymyositis and/or                           Dermatomyositis                 20%  ---------------------   ------------------------   ---------  Scl-70 (antiDNA          Scleroderma (diffuse)      20 - 35%  topoisomerase)           Crest                           13%  ---------------------   ------------------------   ---------  Iliana-1                     Polymyositis and/or                           Dermatomyositis            20 - 40%  ---------------------   ------------------------   ---------  Centromere B             Scleroderma -                            Crest                           variant                         80%   Lab on 12/13/2021   Component Date Value Ref Range Status   • Occult Blood, Fecal by Immunoassay 12/13/2021 Negative  Negative Final   • Occult Blood 12/13/2021 Negative  Negative Final   • Occult Blood 2 12/13/2021 Negative  Negative Final   • Occult Blood 3 12/13/2021 Negative  Negative Final   • OB Date 1 12/13/2021 12/7/2021   Final   • OB Date 2 12/13/2021 12/8/2021   Final   • OB Date 3 12/13/2021 12/10/2021   Final      FL Esophagram Complete  Narrative: PROCEDURE: Barium swallow    HISTORY:  Dysphagia     COMPARISON:  None    Fluoroscopy time was one minute and 37 seconds.  Total # of films = 55     TECHNIQUE:    Mucosal relief views were obtained in the LPO position.  Subsequently, a double contrast esophagram was performed using  effervescent granules followed by thick barium. The esophagus was  evaluated in the LPO position with spot fluoroscopic images  obtained. The single column portion of the exam was performed in  the prone, FERGUSON position.    FINDINGS:  No esophageal filling defect, stricture or mucosal abnormality.  The gastroesophageal junction distends appropriately. There is  normal transit of barium. A moderate-sized hiatal hernia is  present. Gastroesophageal reflux was  observed.    The patient was given a 12.5 mm barium tablet which passed  through the esophagus and into the stomach without delay.   Impression: CONCLUSION:    Moderate sized hiatal hernia.  Gastroesophageal reflux.    Electronically signed by:  Tim Ashley MD  5/19/2020 2:32 PM CDT  Workstation: [id number]      [unfilled]  Immunization History   Administered Date(s) Administered   • COVID-19 (MODERNA) 1st, 2nd, 3rd Dose Only 09/13/2021, 10/11/2021   • Fluzone High-Dose 65+yrs 12/06/2021   • Shingrix 07/04/2021   • Tdap 08/12/2017     The following portions of the patient's history were reviewed and updated as appropriate: allergies, current medications, past family history, past medical history, past social history, past surgical history and problem list.    PHQ-9 Total Score:           Physical Exam  Constitutional:       Appearance: Normal appearance.   HENT:      Head: Normocephalic and atraumatic.      Right Ear: External ear normal.      Left Ear: External ear normal.   Eyes:      General:         Right eye: No discharge.         Left eye: No discharge.      Conjunctiva/sclera: Conjunctivae normal.   Cardiovascular:      Rate and Rhythm: Normal rate and regular rhythm.      Pulses: Normal pulses.      Heart sounds: Normal heart sounds. No murmur heard.  Pulmonary:      Effort: Pulmonary effort is normal. No respiratory distress.      Breath sounds: Normal breath sounds.   Abdominal:      General: There is no distension.      Palpations: Abdomen is soft.      Tenderness: There is no abdominal tenderness.   Musculoskeletal:      Cervical back: Normal range of motion.      Right lower leg: No edema.      Left lower leg: No edema.   Lymphadenopathy:      Cervical: No cervical adenopathy.   Neurological:      Mental Status: She is alert. Mental status is at baseline.   Psychiatric:         Mood and Affect: Mood normal.         Behavior: Behavior normal.         Assessment & Plan    Diagnosis Plan   1. Fibromyalgia   gabapentin (NEURONTIN) 300 MG capsule      No orders of the defined types were placed in this encounter.    Fibromyalgia; doing well on current medications, no acute complaints today, no signs of abuse, misuse, Dwaine reviewed and appropriate, follow-up in 3 months or sooner if needed.          This document has been electronically signed by Tone Simms MD on June 7, 2022 14:06 CDT    EMR Dragon/Transciption Disclaimer: Some of this note may be an electronic transcription/translation of spoken language to printed text.  The electronic translation of spoken language may permit erroneous, or at times, nonsensical words or phrases to be inadvertently transcribed. Although I have reviewed the note for such errors, some may still exist.

## 2022-06-09 RX ORDER — TIZANIDINE 4 MG/1
TABLET ORAL
Qty: 90 TABLET | Refills: 3 | Status: SHIPPED | OUTPATIENT
Start: 2022-06-09 | End: 2023-03-09

## 2022-06-14 ENCOUNTER — OFFICE VISIT (OUTPATIENT)
Dept: GASTROENTEROLOGY | Facility: CLINIC | Age: 72
End: 2022-06-14

## 2022-06-14 VITALS
DIASTOLIC BLOOD PRESSURE: 91 MMHG | HEIGHT: 65 IN | SYSTOLIC BLOOD PRESSURE: 122 MMHG | HEART RATE: 100 BPM | WEIGHT: 228 LBS | BODY MASS INDEX: 37.99 KG/M2

## 2022-06-14 DIAGNOSIS — K21.00 GASTROESOPHAGEAL REFLUX DISEASE WITH ESOPHAGITIS WITHOUT HEMORRHAGE: ICD-10-CM

## 2022-06-14 DIAGNOSIS — E53.8 FOLATE DEFICIENCY: ICD-10-CM

## 2022-06-14 DIAGNOSIS — D64.9 CHRONIC ANEMIA: Primary | ICD-10-CM

## 2022-06-14 DIAGNOSIS — R10.10 PAIN OF UPPER ABDOMEN: ICD-10-CM

## 2022-06-14 PROCEDURE — 99213 OFFICE O/P EST LOW 20 MIN: CPT | Performed by: PHYSICIAN ASSISTANT

## 2022-06-14 NOTE — PROGRESS NOTES
Chief Complaint   Patient presents with   • Anemia   • Heartburn       ENDO PROCEDURE ORDERED:    Subjective    Sofía Clarke is a 72 y.o. female. she is here today for follow-up.    History of Present Illness    Patient seen on a recheck of her Beta Thalassemia, anemia, GERD, abdominal pain. Last seen 11/16/2021. She had a negative FIT test, negative occult stools x3 on 12/13/2021. More recent laboratory 05/12/2022 she had an antag DNA negative. Positive KATHLEEN direct. CRP, RA, uric acid were negative. He is referring her to rheumatology. Patient currently denies abdominal pain. She is on Prilosec and Carafate for chronic heartburn. Denied nausea, vomiting, dysphagia, bowel movements are fairly regular. She states she got dentures and her gums seem to feel very hot to her. She tried multiple different things to try to get relief and she has found that ice cream gives her more relief and her weight is up 24 pounds since last visit. Last EGD with dilatation 10/13/2020. Last colonoscopy 2018.     A/P: Patient with chronic GERD, her weight has improved but I cautioned her in gaining too much back. She denied dysphagia. No evidence for persistent bleeding. She has not had a repeat CBC drawn. Will plan to continue current regimen and plan follow-up in 6 months with CBC, CMP, INR prior, further pending clinical course and the results of the above.       The following portions of the patient's history were reviewed and updated as appropriate:   Past Medical History:   Diagnosis Date   • Anemia    • Colon polyps    • Fibromyalgia, primary 2002   • GERD (gastroesophageal reflux disease)    • Headache    • Hiatal hernia    • Irritable bowel syndrome    • Neuromuscular disorder (HCC)      Past Surgical History:   Procedure Laterality Date   • CARDIAC ABLATION     • CARDIAC CATHETERIZATION     • CARDIAC SURGERY  2000    cath ablation-vein   • COLONOSCOPY  2010/2018   • ENDOSCOPY N/A 10/13/2020    Procedure:  ESOPHAGOGASTRODUODENOSCOPY WITH possible DILATATION--bx;  Surgeon: Daniel Michael MD;  Location: [facility] ENDOSCOPY;  Service: Gastroenterology;  Laterality: N/A;   • WISDOM TOOTH EXTRACTION       Family History   Problem Relation Age of Onset   • Emphysema Mother    • Dementia Mother    • Colon polyps Mother    • Cancer Mother         Colon   • COPD Mother         Emphysema   • Depression Mother         OCD   • Diabetes Father    • Cancer Father         Lung   • Hyperlipidemia Father    • No Known Problems Sister    • Hypertension Brother    • Diabetes Paternal Grandmother    • Hyperlipidemia Paternal Grandmother    • Hypertension Paternal Grandmother    • Cancer Paternal Grandfather         Lung   • Cancer Brother      OB History    No obstetric history on file.       Allergies   Allergen Reactions   • Aspirin GI Intolerance   • Clindamycin/Lincomycin GI Intolerance   • Codeine Nausea And Vomiting   • Lyrica [Pregabalin] Mental Status Change   • Penicillins GI Intolerance   • Latex Rash     Social History     Socioeconomic History   • Marital status:    Tobacco Use   • Smoking status: Never Smoker   • Smokeless tobacco: Never Used   Vaping Use   • Vaping Use: Never used   Substance and Sexual Activity   • Alcohol use: Never   • Drug use: Never   • Sexual activity: Not Currently     Birth control/protection: Post-menopausal     Current Medications:  Prior to Admission medications    Medication Sig Start Date End Date Taking? Authorizing Provider   acetaminophen (TYLENOL) 500 MG tablet Take 500 mg by mouth As Needed for Mild Pain .   Yes ProviderKhoi MD   ALPHA LIPOIC ACID PO Take 1 tablet by mouth Daily.   Yes ProviderKhoi MD   Cyanocobalamin (B-12) 1000 MCG capsule Take 1 tablet by mouth Daily.   Yes ProviderKhoi MD   DULoxetine (CYMBALTA) 60 MG capsule Take 1 capsule by mouth Daily. 12/6/21  Yes Tone Simms MD   folic acid (FOLVITE) 400 MCG tablet Take 400 mcg by mouth  "Daily.   Yes Khoi Albright MD   gabapentin (NEURONTIN) 300 MG capsule Take 1 capsule by mouth Every Night. 6/7/22  Yes Tone Simms MD   omeprazole (priLOSEC) 40 MG capsule TAKE 1 CAPSULE EVERY DAY 1/21/22  Yes Monty Stringer PA-C   Probiotic Product (PROBIOTIC PO) Take 1 capsule by mouth Daily.   Yes Khoi Albright MD   sucralfate (CARAFATE) 1 g tablet TAKE 1 TABLET BY MOUTH EVERY NIGHT AT BEDTIME. 3/11/22  Yes Monty Stringer PA-C   tiZANidine (ZANAFLEX) 4 MG tablet TAKE 1 TABLET EVERY 8 (EIGHT) HOURS AS NEEDED FOR MUSCLE SPASMS. 6/9/22  Yes Tone Simms MD   traZODone (DESYREL) 50 MG tablet TAKE 1 TABLET EVERY NIGHT (NEED APPT AND LABS FOR FURTHER REFILLS) 3/11/22  Yes Tone Simms MD   vitamin B-6 (PYRIDOXINE) 50 MG tablet Take 25 mg by mouth Daily.   Yes Khoi Albright MD   VITAMIN D PO Take 1 tablet by mouth Daily.   Yes Khoi Albright MD     Review of Systems  Review of Systems       Objective    /91   Pulse 100   Ht 165.1 cm (65\")   Wt 103 kg (228 lb)   LMP  (LMP Unknown)   BMI 37.94 kg/m²   Physical Exam  Vitals and nursing note reviewed.   Constitutional:       General: She is not in acute distress.     Appearance: She is well-developed.   HENT:      Head: Normocephalic and atraumatic.   Eyes:      Pupils: Pupils are equal, round, and reactive to light.   Cardiovascular:      Rate and Rhythm: Normal rate and regular rhythm.      Heart sounds: Normal heart sounds.   Pulmonary:      Effort: Pulmonary effort is normal.      Breath sounds: Normal breath sounds.   Abdominal:      General: Bowel sounds are normal. There is no distension or abdominal bruit.      Palpations: Abdomen is soft. Abdomen is not rigid. There is no shifting dullness or mass.      Tenderness: There is abdominal tenderness. There is no guarding or rebound.      Hernia: No hernia is present. There is no hernia in the ventral area.   Musculoskeletal:         General: Normal range of " motion.      Cervical back: Normal range of motion.   Skin:     General: Skin is warm and dry.   Neurological:      Mental Status: She is alert and oriented to person, place, and time.   Psychiatric:         Behavior: Behavior normal.         Thought Content: Thought content normal.         Judgment: Judgment normal.       Assessment & Plan      1. Chronic anemia    2. Folate deficiency    3. Gastroesophageal reflux disease with esophagitis without hemorrhage    4. Pain of upper abdomen    .   Diagnoses and all orders for this visit:    1. Chronic anemia (Primary)  -     CBC Auto Differential; Future  -     Comprehensive Metabolic Panel; Future  -     Protime-INR; Future    2. Folate deficiency  -     CBC Auto Differential; Future  -     Comprehensive Metabolic Panel; Future  -     Protime-INR; Future    3. Gastroesophageal reflux disease with esophagitis without hemorrhage  -     CBC Auto Differential; Future  -     Comprehensive Metabolic Panel; Future  -     Protime-INR; Future    4. Pain of upper abdomen  -     CBC Auto Differential; Future  -     Comprehensive Metabolic Panel; Future  -     Protime-INR; Future        Orders placed during this encounter include:  Orders Placed This Encounter   Procedures   • CBC Auto Differential     Standing Status:   Future     Standing Expiration Date:   12/26/2022     Order Specific Question:   Release to patient     Answer:   Immediate   • Comprehensive Metabolic Panel     Standing Status:   Future     Standing Expiration Date:   12/26/2022     Order Specific Question:   Release to patient     Answer:   Immediate   • Protime-INR     Standing Status:   Future     Standing Expiration Date:   12/26/2022       Medications prescribed:  No orders of the defined types were placed in this encounter.      Requested Prescriptions      No prescriptions requested or ordered in this encounter       Review and/or summary of lab tests, radiology, procedures, medications. Review and  summary of old records and obtaining of history. The risks and benefits of my recommendations, as well as other treatment options were discussed with the patient today. Questions were answered.    Follow-up: Return in about 6 months (around 12/14/2022), or if symptoms worsen or fail to improve, for lab prior.     * Surgery not found *      This document has been electronically signed by Monty Stringer PA-C on Luisa 15, 2022 18:30 CDT      Results for orders placed or performed in visit on 05/12/22   DNA / DS    Specimen: Blood   Result Value Ref Range    Anti-DNA (DS) Ab Qn <1 0 - 9 IU/mL    See below: Comment    Antistreptolysin O screen    Specimen: Blood   Result Value Ref Range    ASO Negative Negative   Rheumatoid factor    Specimen: Blood   Result Value Ref Range    Rheumatoid Factor Quantitative <10.0 0.0 - 14.0 IU/mL   C-reactive protein    Specimen: Blood   Result Value Ref Range    C-Reactive Protein 0.37 0.00 - 0.50 mg/dL   KATHLEEN    Specimen: Blood   Result Value Ref Range    KATHLEEN Direct Positive (A) Negative   Uric acid    Specimen: Blood   Result Value Ref Range    Uric Acid 5.7 2.4 - 5.7 mg/dL   Results for orders placed or performed in visit on 12/13/21   Occult Blood, Fecal By Immunoassay - Stool, Per Rectum    Specimen: Per Rectum; Stool   Result Value Ref Range    Occult Blood, Fecal by Immunoassay Negative Negative   Occult Blood X 3, Stool - Stool, Per Rectum    Specimen: Per Rectum; Stool   Result Value Ref Range    Occult Blood Negative Negative    Occult Blood 2 Negative Negative    Occult Blood 3 Negative Negative    OB Date 1 12/7/2021     OB Date 2 12/8/2021     OB Date 3 12/10/2021    Results for orders placed or performed in visit on 08/12/21   Lipid Panel    Specimen: Blood   Result Value Ref Range    Total Cholesterol 143 0 - 200 mg/dL    Triglycerides 115 0 - 150 mg/dL    HDL Cholesterol 40 40 - 60 mg/dL    LDL Cholesterol  82 0 - 100 mg/dL    VLDL Cholesterol 21 5 - 40 mg/dL    LDL/HDL  Ratio 2.00    Results for orders placed or performed in visit on 08/12/21   HCV Antibody Rfx To Qnt PCR    Specimen: Blood   Result Value Ref Range    Hepatitis C Ab <0.1 0.0 - 0.9 s/co ratio   Interpretation:    Specimen: Blood   Result Value Ref Range    Interpretation Comment    CBC Auto Differential    Specimen: Blood   Result Value Ref Range    WBC 5.69 3.40 - 10.80 10*3/mm3    RBC 5.04 3.77 - 5.28 10*6/mm3    Hemoglobin 10.6 (L) 12.0 - 15.9 g/dL    Hematocrit 34.6 34.0 - 46.6 %    MCV 68.7 (L) 79.0 - 97.0 fL    MCH 21.0 (L) 26.6 - 33.0 pg    MCHC 30.6 (L) 31.5 - 35.7 g/dL    RDW 16.3 (H) 12.3 - 15.4 %    RDW-SD 38.6 37.0 - 54.0 fl    MPV 10.4 6.0 - 12.0 fL    Platelets 301 140 - 450 10*3/mm3   Iron Profile    Specimen: Blood   Result Value Ref Range    Iron 154 (H) 37 - 145 mcg/dL    Iron Saturation 46 20 - 50 %    Transferrin 224 200 - 360 mg/dL    TIBC 334 298 - 536 mcg/dL   Manual Differential    Specimen: Blood   Result Value Ref Range    Neutrophil % 44.3 42.7 - 76.0 %    Lymphocyte % 50.5 (H) 19.6 - 45.3 %    Monocyte % 3.1 (L) 5.0 - 12.0 %    Eosinophil % 1.0 0.3 - 6.2 %    Basophil % 1.0 0.0 - 1.5 %    Neutrophils Absolute 2.52 1.70 - 7.00 10*3/mm3    Lymphocytes Absolute 2.87 0.70 - 3.10 10*3/mm3    Monocytes Absolute 0.18 0.10 - 0.90 10*3/mm3    Eosinophils Absolute 0.06 0.00 - 0.40 10*3/mm3    Basophils Absolute 0.06 0.00 - 0.20 10*3/mm3    Anisocytosis Mod/2+ None Seen    Basophilic Stippling Slight/1+ None Seen    Brennen Cells Mod/2+ None Seen    Microcytes Slight/1+ None Seen    Poikilocytes Mod/2+ None Seen    Smudge Cells Slight/1+ None Seen    Platelet Morphology Normal Normal   Ferritin    Specimen: Blood   Result Value Ref Range    Ferritin 151.00 (H) 13.00 - 150.00 ng/mL   Comprehensive Metabolic Panel    Specimen: Blood   Result Value Ref Range    Glucose 112 (H) 65 - 99 mg/dL    BUN 12 8 - 23 mg/dL    Creatinine 0.89 0.57 - 1.00 mg/dL    Sodium 140 136 - 145 mmol/L    Potassium 4.0 3.5 -  5.2 mmol/L    Chloride 104 98 - 107 mmol/L    CO2 23.6 22.0 - 29.0 mmol/L    Calcium 9.1 8.6 - 10.5 mg/dL    Total Protein 7.0 6.0 - 8.5 g/dL    Albumin 4.50 3.50 - 5.20 g/dL    ALT (SGPT) 15 1 - 33 U/L    AST (SGOT) 16 1 - 32 U/L    Alkaline Phosphatase 77 39 - 117 U/L    Total Bilirubin 0.8 0.0 - 1.2 mg/dL    eGFR Non African Amer 63 >60 mL/min/1.73    Globulin 2.5 gm/dL    A/G Ratio 1.8 g/dL    BUN/Creatinine Ratio 13.5 7.0 - 25.0    Anion Gap 12.4 5.0 - 15.0 mmol/L   Results for orders placed or performed during the hospital encounter of 10/13/20   Tissue Pathology Exam    Specimen: A: Gastric, Antrum; Tissue    B: Esophagus, Distal; Tissue   Result Value Ref Range    Case Report       Surgical Pathology Report                         Case: [id number]                                  Authorizing Provider:  Daniel Michael MD        Collected:           10/13/2020 01:41 PM          Ordering Location:     [facility]             Received:            10/14/2020 06:58 AM                                 [facility]                                                     Pathologist:           Demar Florian MD                                                           Specimens:   1) - Gastric, Antrum, antrum bx                                                                     2) - Esophagus, Distal, distal esophagus bx                                                Final Diagnosis       SEE SCANNED REPORT       Results for orders placed or performed in visit on 10/10/20   COVID [facility] Priority - Swab, Nasopharynx    Specimen: Nasopharynx; Swab   Result Value Ref Range    COVID [facility] PRIORITY Comment    COVID-19,[facility] ROUTINE, NP/OP SWAB IN TRANSPORT MEDIA OR ESWAB 72 HR TAT - Swab, Nasopharynx    Specimen: Nasopharynx; Swab   Result Value Ref Range    SARS-CoV-2, ROSEMARIE Not Detected Not Detected   Results for orders placed or performed during the hospital encounter of 05/19/20   SARS-CoV-2, ROSEMARIE  ([facility]) - Swab, Nasopharynx    Specimen: Nasopharynx; Swab   Result Value Ref Range    SARS-CoV-2, ROSEMARIE Not Detected Not Detected   Results for orders placed or performed in visit on 03/12/20   Sedimentation rate, automated    Specimen: Blood   Result Value Ref Range    Sed Rate 8 0 - 20 mm/hr   Rheumatoid factor    Specimen: Blood   Result Value Ref Range    Rheumatoid Factor Quantitative <10.0 0.0 - 14.0 IU/mL   C-reactive protein    Specimen: Blood   Result Value Ref Range    C-Reactive Protein 0.53 (H) 0.00 - 0.50 mg/dL   KATHLEEN    Specimen: Blood   Result Value Ref Range    KATHLEEN Direct Negative Negative   Liquid-based Pap Smear, Screening    Specimen: Cervix; ThinPrep Vial   Result Value Ref Range    Case Report       Gynecologic Cytology Report                       Case: [id number]                                  Authorizing Provider:  Merle Parker APRN    Collected:           03/12/2020 04:12 PM          Ordering Location:     [facility]     Received:            03/12/2020 04:12 PM                                 [facility]                                                                 First Screen:          Sabrina Gomez                                                              Specimen:    Liquid-Based Pap, Screening, Cervix                                                        Interpretation Negative for intraepithelial lesion or malignancy      General Categorization Within normal limits     Specimen Adequacy Satisfactory for evaluation     Additional Information       Disclaimer: Cervical cytology is a screening test primarily for squamous cancer and its precursors and has associated false-negative and false-positive results.  Technologies such as liquid-based preparations may decrease but will not eliminate all false-negative results.  Follow-up of unexplained clinical signs and  symptoms is recommended to minimize false-negative results. (The [employer] System for Reporting Cervical Cytology: [employer], 2015).     Results for orders placed or performed in visit on 01/27/20   Iron and TIBC    Specimen: Blood   Result Value Ref Range    Iron 102 37 - 145 mcg/dL    Iron Saturation 25 20 - 50 %    Transferrin 277 200 - 360 mg/dL    TIBC 413 298 - 536 mcg/dL   Results for orders placed or performed in visit on 12/12/19   CBC Auto Differential    Specimen: Blood   Result Value Ref Range    WBC 7.82 3.40 - 10.80 10*3/mm3    RBC 4.90 3.77 - 5.28 10*6/mm3    Hemoglobin 10.7 (L) 12.0 - 15.9 g/dL    Hematocrit 33.0 (L) 34.0 - 46.6 %    MCV 67.3 (L) 79.0 - 97.0 fL    MCH 21.8 (L) 26.6 - 33.0 pg    MCHC 32.4 31.5 - 35.7 g/dL    RDW 15.7 (H) 12.3 - 15.4 %    RDW-SD 36.7 (L) 37.0 - 54.0 fl    MPV 10.5 6.0 - 12.0 fL    Platelets 305 140 - 450 10*3/mm3     *Note: Due to a large number of results and/or encounters for the requested time period, some results have not been displayed. A complete set of results can be found in Results Review.

## 2022-07-05 RX ORDER — OMEPRAZOLE 40 MG/1
CAPSULE, DELAYED RELEASE ORAL
Qty: 90 CAPSULE | Refills: 1 | Status: SHIPPED | OUTPATIENT
Start: 2022-07-05 | End: 2023-02-13

## 2022-07-25 DIAGNOSIS — G47.00 INSOMNIA, UNSPECIFIED TYPE: ICD-10-CM

## 2022-07-25 RX ORDER — HYDROXYCHLOROQUINE SULFATE 200 MG/1
TABLET, FILM COATED ORAL DAILY
COMMUNITY
End: 2022-07-29

## 2022-07-25 NOTE — TELEPHONE ENCOUNTER
Rx Refill Note  Requested Prescriptions     Pending Prescriptions Disp Refills   • traZODone (DESYREL) 50 MG tablet 90 tablet 1      Last office visit with prescribing clinician: 6/7/2022      Next office visit with prescribing clinician: 9/12/2022            Malena Miller MA  07/25/22, 09:51 CDT     Received a fax from pharmacy stating that concomitant use of Trazodone 50 mg with Hydroxychloroquine 200 mg may cause QT prolongation. Please confirm ok to fill Trazodone 50 mg while pt is taking Hydroxychloroquine 200 mg.  If so, a new RX needs to be sent in for Trazodone.

## 2022-07-27 RX ORDER — TRAZODONE HYDROCHLORIDE 50 MG/1
50 TABLET ORAL NIGHTLY
Qty: 90 TABLET | Refills: 1 | Status: SHIPPED | OUTPATIENT
Start: 2022-07-27 | End: 2022-12-28

## 2022-07-29 ENCOUNTER — PATIENT MESSAGE (OUTPATIENT)
Dept: FAMILY MEDICINE CLINIC | Facility: CLINIC | Age: 72
End: 2022-07-29

## 2022-08-03 ENCOUNTER — TELEPHONE (OUTPATIENT)
Dept: FAMILY MEDICINE CLINIC | Facility: CLINIC | Age: 72
End: 2022-08-03

## 2022-08-03 RX ORDER — SUCRALFATE 1 G/1
1 TABLET ORAL
Qty: 120 TABLET | Refills: 1 | Status: SHIPPED | OUTPATIENT
Start: 2022-08-03 | End: 2022-08-17 | Stop reason: SDUPTHER

## 2022-08-03 NOTE — TELEPHONE ENCOUNTER
"Received a fax for a clarification request because it stated that \"Comncomitant use of Trazodone 50 mg tab with Hydroxychloroquine 200 mg may cause QT prolongation. Please confirm ok to fill Trazodone 50 mg tab while pt is taking Hydroxychloroquine 200 mg.\"    Called [facility] (formerly [facility]) and made them aware that pt had an allergic reaction to hydroxychloroquine so she is no longer taking this medication. Therefore, she is fine to continue the Trazodone.  "

## 2022-08-17 RX ORDER — SUCRALFATE 1 G/1
1 TABLET ORAL
Qty: 120 TABLET | Refills: 1 | Status: SHIPPED | OUTPATIENT
Start: 2022-08-17 | End: 2023-01-12

## 2022-09-12 ENCOUNTER — OFFICE VISIT (OUTPATIENT)
Dept: FAMILY MEDICINE CLINIC | Facility: CLINIC | Age: 72
End: 2022-09-12

## 2022-09-12 VITALS
SYSTOLIC BLOOD PRESSURE: 140 MMHG | HEART RATE: 101 BPM | WEIGHT: 229 LBS | BODY MASS INDEX: 38.15 KG/M2 | HEIGHT: 65 IN | TEMPERATURE: 96.9 F | OXYGEN SATURATION: 96 % | DIASTOLIC BLOOD PRESSURE: 94 MMHG

## 2022-09-12 DIAGNOSIS — Z12.31 ENCOUNTER FOR SCREENING MAMMOGRAM FOR MALIGNANT NEOPLASM OF BREAST: ICD-10-CM

## 2022-09-12 DIAGNOSIS — S99.919A ANKLE INJURY, INITIAL ENCOUNTER: Primary | ICD-10-CM

## 2022-09-12 DIAGNOSIS — M79.7 FIBROMYALGIA: ICD-10-CM

## 2022-09-12 PROCEDURE — 99214 OFFICE O/P EST MOD 30 MIN: CPT | Performed by: STUDENT IN AN ORGANIZED HEALTH CARE EDUCATION/TRAINING PROGRAM

## 2022-09-12 RX ORDER — DULOXETIN HYDROCHLORIDE 60 MG/1
60 CAPSULE, DELAYED RELEASE ORAL DAILY
Qty: 90 CAPSULE | Refills: 1 | Status: SHIPPED | OUTPATIENT
Start: 2022-09-12 | End: 2022-12-14 | Stop reason: SDUPTHER

## 2022-09-12 RX ORDER — GABAPENTIN 300 MG/1
300 CAPSULE ORAL NIGHTLY
Qty: 90 CAPSULE | Refills: 0 | Status: SHIPPED | OUTPATIENT
Start: 2022-09-12 | End: 2022-12-14 | Stop reason: SDUPTHER

## 2022-09-12 NOTE — PROGRESS NOTES
"Subjective:  Sofía Clarke is a 72 y.o. female who presents for     Fibromyalgia; currently gabapentin 300 mg nightly, Cymbalta 60 mg daily, ibuprofen 200 mg 4 times daily as needed, Zanaflex 4 mg 3 times daily as needed.  Recently seen rheumatology, diagnosed with connective tissue disorder, started on hydroxychloroquine but had severe allergic reaction, was transition to methotrexate.  Additionally, did have a fall approximately 2 weeks ago, injured her right foot, had bruising/swelling along outside of right ankle and foot.  Did not seek medical care, denied any imaging.  Is able to bear weight on foot, pain has been improving.    Patient Active Problem List   Diagnosis   • Morbidly obese (HCC)   • Gastroesophageal reflux disease   • Nausea   • Dysphagia   • Multiple food allergies   • Fibromyalgia     Vitals:    Vitals:    09/12/22 1023   BP: 140/94   BP Location: Right arm   Patient Position: Sitting   Cuff Size: Large Adult   Pulse: 101   Temp: 96.9 °F (36.1 °C)   SpO2: 96%   Weight: 104 kg (229 lb)   Height: 165.1 cm (65\")     Body mass index is 38.11 kg/m².      Current Outpatient Medications:   •  acetaminophen (TYLENOL) 500 MG tablet, Take 500 mg by mouth As Needed for Mild Pain ., Disp: , Rfl:   •  ALPHA LIPOIC ACID PO, Take 1 tablet by mouth Daily., Disp: , Rfl:   •  Cyanocobalamin (B-12) 1000 MCG capsule, Take 1 tablet by mouth Daily., Disp: , Rfl:   •  DULoxetine (CYMBALTA) 60 MG capsule, Take 1 capsule by mouth Daily., Disp: 90 capsule, Rfl: 1  •  folic acid (FOLVITE) 400 MCG tablet, Take 400 mcg by mouth Daily., Disp: , Rfl:   •  gabapentin (NEURONTIN) 300 MG capsule, Take 1 capsule by mouth Every Night., Disp: 90 capsule, Rfl: 0  •  methotrexate 2.5 MG tablet, Take 3 tablets by mouth 1 (One) Time Per Week., Disp: , Rfl:   •  omeprazole (priLOSEC) 40 MG capsule, TAKE 1 CAPSULE EVERY DAY, Disp: 90 capsule, Rfl: 1  •  Probiotic Product (PROBIOTIC PO), Take 1 capsule by mouth Daily., Disp: , Rfl: "   •  sucralfate (CARAFATE) 1 g tablet, Take 1 tablet by mouth every night at bedtime., Disp: 120 tablet, Rfl: 1  •  tiZANidine (ZANAFLEX) 4 MG tablet, TAKE 1 TABLET EVERY 8 (EIGHT) HOURS AS NEEDED FOR MUSCLE SPASMS., Disp: 90 tablet, Rfl: 3  •  traZODone (DESYREL) 50 MG tablet, Take 1 tablet by mouth Every Night., Disp: 90 tablet, Rfl: 1  •  vitamin B-6 (PYRIDOXINE) 50 MG tablet, Take 25 mg by mouth Daily., Disp: , Rfl:   •  VITAMIN D PO, Take 1 tablet by mouth Daily., Disp: , Rfl:     Patient Active Problem List   Diagnosis   • Morbidly obese (HCC)   • Gastroesophageal reflux disease   • Nausea   • Dysphagia   • Multiple food allergies   • Fibromyalgia     Past Surgical History:   Procedure Laterality Date   • CARDIAC ABLATION     • CARDIAC CATHETERIZATION     • CARDIAC SURGERY  2000    cath ablation-vein   • COLONOSCOPY  2010/2018   • ENDOSCOPY N/A 10/13/2020    Procedure: ESOPHAGOGASTRODUODENOSCOPY WITH possible DILATATION--bx;  Surgeon: Daniel Michael MD;  Location: [facility] ENDOSCOPY;  Service: Gastroenterology;  Laterality: N/A;   • WISDOM TOOTH EXTRACTION       Social History     Socioeconomic History   • Marital status:    Tobacco Use   • Smoking status: Never Smoker   • Smokeless tobacco: Never Used   Vaping Use   • Vaping Use: Never used   Substance and Sexual Activity   • Alcohol use: Never   • Drug use: Never   • Sexual activity: Not Currently     Birth control/protection: Post-menopausal     Family History   Problem Relation Age of Onset   • Emphysema Mother    • Dementia Mother    • Colon polyps Mother    • Cancer Mother         Colon   • COPD Mother         Emphysema   • Depression Mother         OCD   • Diabetes Father    • Cancer Father         Lung   • Hyperlipidemia Father    • No Known Problems Sister    • Hypertension Brother    • Diabetes Paternal Grandmother    • Hyperlipidemia Paternal Grandmother    • Hypertension Paternal Grandmother    • Cancer Paternal Grandfather         Lung   •  Cancer [facility]      Lab on 2022   Component Date Value Ref Range Status   • Uric Acid 2022 5.7  2.4 - 5.7 mg/dL Final   • ASO 2022 Negative  Negative Final   • Rheumatoid Factor Quantitative 2022 <10.0  0.0 - 14.0 IU/mL Final   • C-Reactive Protein 2022 0.37  0.00 - 0.50 mg/dL Final   • KATHLEEN Direct 2022 Positive (A) Negative Final   • Anti-DNA (DS) Ab Qn 2022 <1  0 - 9 IU/mL Final                                       Negative      <5                                     Equivocal  5 - 9                                     Positive      >9   • See below: 2022 Comment   Final    Comment: Autoantibody                       Disease Association  ------------------------------------------------------------                          Condition                  Frequency  ---------------------   ------------------------   ---------  Antinuclear Antibody,    SLE, mixed connective  Direct (KATHLEEN-D)           tissue diseases  ---------------------   ------------------------   ---------  dsDNA                    SLE                        40 - 60%  ---------------------   ------------------------   ---------  Chromatin                Drug induced SLE                90%                           SLE                        48 - 97%  ---------------------   ------------------------   ---------  SSA (Ro)                 SLE                        25 - 35%                           Sjogren's Syndrome         40 - 70%                            Lupus                 100%  ---------------------   ------------------------   ---------  SSB (La)                 SLE                                                        10%                           Sjogren's Syndrome              30%  ---------------------   -----------------------    ---------  Sm (anti-Smith)          SLE                        15 - 30%  ---------------------   -----------------------    ---------  RNP                       Mixed Connective Tissue                           Disease                         95%  (U1 nRNP,                SLE                        30 - 50%  anti-ribonucleoprotein)  Polymyositis and/or                           Dermatomyositis                 20%  ---------------------   ------------------------   ---------  Scl-70 (antiDNA          Scleroderma (diffuse)      20 - 35%  topoisomerase)           Crest                           13%  ---------------------   ------------------------   ---------  Iliana-1                     Polymyositis and/or                           Dermatomyositis            20 - 40%  ---------------------   ------------------------   ---------  Centromere B             Scleroderma -                            Crest                           variant                         80%      FL Esophagram Complete  Narrative: PROCEDURE: Barium swallow    HISTORY:  Dysphagia     COMPARISON:  None    Fluoroscopy time was one minute and 37 seconds.  Total # of films = 55     TECHNIQUE:    Mucosal relief views were obtained in the LPO position.  Subsequently, a double contrast esophagram was performed using  effervescent granules followed by thick barium. The esophagus was  evaluated in the LPO position with spot fluoroscopic images  obtained. The single column portion of the exam was performed in  the prone, FERGUSON position.    FINDINGS:  No esophageal filling defect, stricture or mucosal abnormality.  The gastroesophageal junction distends appropriately. There is  normal transit of barium. A moderate-sized hiatal hernia is  present. Gastroesophageal reflux was observed.    The patient was given a 12.5 mm barium tablet which passed  through the esophagus and into the stomach without delay.   Impression: CONCLUSION:    Moderate sized hiatal hernia.  Gastroesophageal reflux.    Electronically signed by:  Tim Ashley MD  5/19/2020 2:32 PM CDT  Workstation: [id number]      [email or web address]  Immunization History    Administered Date(s) Administered   • COVID-19 (MODERNA) 1st, 2nd, 3rd Dose Only 09/13/2021, 10/11/2021   • Fluzone High-Dose 65+yrs 12/06/2021   • Shingrix 07/04/2021   • Tdap 08/12/2017     The following portions of the patient's history were reviewed and updated as appropriate: allergies, current medications, past family history, past medical history, past social history, past surgical history and problem list.    PHQ-9 Total Score:             Physical Exam  Constitutional:       Appearance: Normal appearance.   HENT:      Head: Normocephalic and atraumatic.      Right Ear: External ear normal.      Left Ear: External ear normal.   Eyes:      General:         Right eye: No discharge.         Left eye: No discharge.      Conjunctiva/sclera: Conjunctivae normal.   Cardiovascular:      Rate and Rhythm: Normal rate and regular rhythm.      Pulses: Normal pulses.      Heart sounds: Normal heart sounds. No murmur heard.  Pulmonary:      Effort: Pulmonary effort is normal. No respiratory distress.      Breath sounds: Normal breath sounds.   Abdominal:      General: There is no distension.      Palpations: Abdomen is soft.      Tenderness: There is no abdominal tenderness.   Musculoskeletal:      Cervical back: Normal range of motion.      Right lower leg: Edema present.      Left lower leg: No edema.      Right foot: Decreased range of motion.        Feet:    Feet:      Right foot:      Skin integrity: Skin integrity normal.   Lymphadenopathy:      Cervical: No cervical adenopathy.   Neurological:      Mental Status: She is alert. Mental status is at baseline.   Psychiatric:         Mood and Affect: Mood normal.         Behavior: Behavior normal.         Assessment & Plan    Diagnosis Plan   1. Ankle injury, initial encounter  XR Ankle 3+ View Right   2. Encounter for screening mammogram for malignant neoplasm of breast  Mammo Screening Bilateral With CAD   3. Fibromyalgia  gabapentin (NEURONTIN) 300 MG capsule     DULoxetine (CYMBALTA) 60 MG capsule      Orders Placed This Encounter   Procedures   • Mammo Screening Bilateral With CAD     Standing Status:   Future     Standing Expiration Date:   9/12/2023     Scheduling Instructions:      Siri Barroso     Order Specific Question:   Reason for Exam:     Answer:   screening     Order Specific Question:   Release to patient     Answer:   Routine Release   • XR Ankle 3+ View Right     Order Specific Question:   Reason for Exam:     Answer:   ankle injury     Order Specific Question:   Release to patient     Answer:   Routine Release     Fibromyalgia; doing well with current medications, will continue, no SI/HI/A/V hallucinations, pain well controlled, Dwaine reviewed and appropriate, no signs abuse, misuse, will refill.    Ankle injury; likely sprain since patient able to ambulate on foot but due to pronounced swelling, will obtain x-ray as above, refer as indicated.  Counseled on conservative management, given ankle stabilizer brace.          This document has been electronically signed by Tone Simms MD on September 12, 2022 11:10 CDT    EMR Dragon/Transciption Disclaimer: Some of this note may be an electronic transcription/translation of spoken language to printed text.  The electronic translation of spoken language may permit erroneous, or at times, nonsensical words or phrases to be inadvertently transcribed. Although I have reviewed the note for such errors, some may still exist.

## 2022-09-23 ENCOUNTER — TELEPHONE (OUTPATIENT)
Dept: FAMILY MEDICINE CLINIC | Facility: CLINIC | Age: 72
End: 2022-09-23

## 2022-09-23 DIAGNOSIS — S99.919A ANKLE INJURY, INITIAL ENCOUNTER: Primary | ICD-10-CM

## 2022-09-23 NOTE — TELEPHONE ENCOUNTER
"Preliminary report for xray shows a fracture of the lateral malleolus. Dr Simms said \"Recommend patient to obtain splint/cast and remain nonweightbearing until evaluated by orthopedic surgery.\"  He has put in a referral.    Pt notified.        "

## 2022-10-14 DIAGNOSIS — Z12.31 ENCOUNTER FOR SCREENING MAMMOGRAM FOR MALIGNANT NEOPLASM OF BREAST: ICD-10-CM

## 2022-10-19 DIAGNOSIS — M79.7 FIBROMYALGIA: ICD-10-CM

## 2022-10-19 RX ORDER — GABAPENTIN 300 MG/1
300 CAPSULE ORAL NIGHTLY
Qty: 90 CAPSULE | OUTPATIENT
Start: 2022-10-19

## 2022-10-19 NOTE — TELEPHONE ENCOUNTER
Rx Refill Note  Requested Prescriptions     Refused Prescriptions Disp Refills   • gabapentin (NEURONTIN) 300 MG capsule [Pharmacy Med Name: GABAPENTIN 300MG CAPSULES] 90 capsule      Sig: Take 1 capsule by mouth Every Night.      Last office visit with prescribing clinician: 9/12/2022      Next office visit with prescribing clinician: 12/12/2022   {TIP  Encounters:    RX SENT ON 9/12/22 FOR A 90 DAY SUPPLY         Erin Fraser LPN  10/19/22, 13:28 CDT

## 2022-12-14 ENCOUNTER — OFFICE VISIT (OUTPATIENT)
Dept: FAMILY MEDICINE CLINIC | Facility: CLINIC | Age: 72
End: 2022-12-14

## 2022-12-14 ENCOUNTER — APPOINTMENT (OUTPATIENT)
Dept: LAB | Facility: HOSPITAL | Age: 72
End: 2022-12-14

## 2022-12-14 VITALS
SYSTOLIC BLOOD PRESSURE: 114 MMHG | BODY MASS INDEX: 36.82 KG/M2 | TEMPERATURE: 98.1 F | DIASTOLIC BLOOD PRESSURE: 78 MMHG | OXYGEN SATURATION: 96 % | HEART RATE: 95 BPM | WEIGHT: 221 LBS | HEIGHT: 65 IN

## 2022-12-14 DIAGNOSIS — M79.7 FIBROMYALGIA: ICD-10-CM

## 2022-12-14 PROBLEM — M35.9 AUTOIMMUNE DISEASE: Status: ACTIVE | Noted: 2022-10-12

## 2022-12-14 PROBLEM — K58.9 IRRITABLE BOWEL SYNDROME: Status: ACTIVE | Noted: 2022-10-12

## 2022-12-14 PROBLEM — D56.9 THALASSEMIA: Status: ACTIVE | Noted: 2022-10-12

## 2022-12-14 PROCEDURE — 99214 OFFICE O/P EST MOD 30 MIN: CPT | Performed by: STUDENT IN AN ORGANIZED HEALTH CARE EDUCATION/TRAINING PROGRAM

## 2022-12-14 RX ORDER — GABAPENTIN 300 MG/1
300 CAPSULE ORAL NIGHTLY
Qty: 90 CAPSULE | Refills: 0 | Status: SHIPPED | OUTPATIENT
Start: 2022-12-14 | End: 2023-03-27 | Stop reason: SDUPTHER

## 2022-12-14 RX ORDER — DULOXETIN HYDROCHLORIDE 60 MG/1
60 CAPSULE, DELAYED RELEASE ORAL DAILY
Qty: 90 CAPSULE | Refills: 3 | Status: SHIPPED | OUTPATIENT
Start: 2022-12-14

## 2022-12-14 NOTE — PROGRESS NOTES
"Subjective:  Sofía Clarke is a 72 y.o. female who presents for     Fibromyalgia; Currently on Cymbalta 60 mg daily, gabapentin 300 mg daily.  States that pain, mood has been well controlled.  Denies SI/HI/AV hallucinations.  Denies any numbness, tingling, weakness.  States that medication provides good relief, no acute complaints today, needs refills today.    Patient Active Problem List   Diagnosis   • Morbidly obese (HCC)   • Gastroesophageal reflux disease   • Nausea   • Dysphagia   • Multiple food allergies   • Fibromyalgia   • Autoimmune disease (HCC)   • Irritable bowel syndrome   • Thalassemia     Vitals:    Vitals:    12/14/22 1134   BP: 114/78   BP Location: Right arm   Patient Position: Sitting   Cuff Size: Large Adult   Pulse: 95   Temp: 98.1 °F (36.7 °C)   SpO2: 96%   Weight: 100 kg (221 lb)   Height: 165.1 cm (65\")     Body mass index is 36.78 kg/m².      Current Outpatient Medications:   •  acetaminophen (TYLENOL) 500 MG tablet, Take 500 mg by mouth As Needed for Mild Pain ., Disp: , Rfl:   •  ALPHA LIPOIC ACID PO, Take 1 tablet by mouth Daily., Disp: , Rfl:   •  Cyanocobalamin (B-12) 1000 MCG capsule, Take 1 tablet by mouth Daily., Disp: , Rfl:   •  DULoxetine (CYMBALTA) 60 MG capsule, Take 1 capsule by mouth Daily., Disp: 90 capsule, Rfl: 3  •  folic acid (FOLVITE) 400 MCG tablet, Take 400 mcg by mouth Daily., Disp: , Rfl:   •  gabapentin (NEURONTIN) 300 MG capsule, Take 1 capsule by mouth Every Night., Disp: 90 capsule, Rfl: 0  •  omeprazole (priLOSEC) 40 MG capsule, TAKE 1 CAPSULE EVERY DAY, Disp: 90 capsule, Rfl: 1  •  Probiotic Product (PROBIOTIC PO), Take 1 capsule by mouth Daily., Disp: , Rfl:   •  sucralfate (CARAFATE) 1 g tablet, Take 1 tablet by mouth every night at bedtime., Disp: 120 tablet, Rfl: 1  •  tiZANidine (ZANAFLEX) 4 MG tablet, TAKE 1 TABLET EVERY 8 (EIGHT) HOURS AS NEEDED FOR MUSCLE SPASMS., Disp: 90 tablet, Rfl: 3  •  traZODone (DESYREL) 50 MG tablet, Take 1 tablet by " mouth Every Night., Disp: 90 tablet, Rfl: 1  •  vitamin B-6 (PYRIDOXINE) 50 MG tablet, Take 25 mg by mouth Daily., Disp: , Rfl:   •  VITAMIN D PO, Take 1 tablet by mouth Daily., Disp: , Rfl:     Patient Active Problem List   Diagnosis   • Morbidly obese (HCC)   • Gastroesophageal reflux disease   • Nausea   • Dysphagia   • Multiple food allergies   • Fibromyalgia   • Autoimmune disease (HCC)   • Irritable bowel syndrome   • Thalassemia     Past Surgical History:   Procedure Laterality Date   • CARDIAC ABLATION     • CARDIAC CATHETERIZATION     • CARDIAC SURGERY  2000    cath ablation-vein   • COLONOSCOPY  2010/2018   • ENDOSCOPY N/A 10/13/2020    Procedure: ESOPHAGOGASTRODUODENOSCOPY WITH possible DILATATION--bx;  Surgeon: Daniel Michael MD;  Location: [facility] ENDOSCOPY;  Service: Gastroenterology;  Laterality: N/A;   • WISDOM TOOTH EXTRACTION       Social History     Socioeconomic History   • Marital status:    Tobacco Use   • Smoking status: Never   • Smokeless tobacco: Never   Vaping Use   • Vaping Use: Never used   Substance and Sexual Activity   • Alcohol use: Not Currently     Alcohol/week: 1.0 standard drink     Types: 1 Glasses of wine per week     Comment: 1986 no more at all, pregnancy planned   • Drug use: Never   • Sexual activity: Not Currently     Birth control/protection: Post-menopausal, None     Comment: Prefer male but none current     Family History   Problem Relation Age of Onset   • Emphysema Mother    • Dementia Mother    • Colon polyps Mother    • Cancer Mother         Colon   • COPD Mother         Emphysema   • Depression Mother         OCD   • Diabetes Father    • Cancer Father         Lung   • Hyperlipidemia Father    • No Known Problems Sister    • Hypertension Brother    • Diabetes Paternal Grandmother         Diabetes, high blood pressure   • Hyperlipidemia Paternal Grandmother    • Hypertension Paternal Grandmother    • Cancer Paternal Grandfather         Lung   • Cancer Brother          Prostate   • Other Maternal Grandfather         Lupus; autoimmune   • Hypertension Brother    • Other Sister         Lupus     No visits with results within 6 Month(s) from this visit.   Latest known visit with results is:   Lab on 2022   Component Date Value Ref Range Status   • Uric Acid 2022 5.7  2.4 - 5.7 mg/dL Final   • ASO 2022 Negative  Negative Final   • Rheumatoid Factor Quantitative 2022 <10.0  0.0 - 14.0 IU/mL Final   • C-Reactive Protein 2022 0.37  0.00 - 0.50 mg/dL Final   • KATHLEEN Direct 2022 Positive (A)  Negative Final   • Anti-DNA (DS) Ab Qn 2022 <1  0 - 9 IU/mL Final                                       Negative      <5                                     Equivocal  5 - 9                                     Positive      >9   • See below: 2022 Comment   Final    Comment: Autoantibody                       Disease Association  ------------------------------------------------------------                          Condition                  Frequency  ---------------------   ------------------------   ---------  Antinuclear Antibody,    SLE, mixed connective  Direct (KATHLEEN-D)           tissue diseases  ---------------------   ------------------------   ---------  dsDNA                    SLE                        40 - 60%  ---------------------   ------------------------   ---------  Chromatin                Drug induced SLE                90%                           SLE                        48 - 97%  ---------------------   ------------------------   ---------  SSA (Ro)                 SLE                        25 - 35%                           Sjogren's Syndrome         40 - 70%                            Lupus                 100%  ---------------------   ------------------------   ---------  SSB (La)                 SLE                                                        10%                           Sjogren's Syndrome               30%  ---------------------   -----------------------    ---------  Sm (anti-Smith)          SLE                        15 - 30%  ---------------------   -----------------------    ---------  RNP                      Mixed Connective Tissue                           Disease                         95%  (U1 nRNP,                SLE                        30 - 50%  anti-ribonucleoprotein)  Polymyositis and/or                           Dermatomyositis                 20%  ---------------------   ------------------------   ---------  Scl-70 (antiDNA          Scleroderma (diffuse)      20 - 35%  topoisomerase)           Crest                           13%  ---------------------   ------------------------   ---------  Iliana-1                     Polymyositis and/or                           Dermatomyositis            20 - 40%  ---------------------   ------------------------   ---------  Centromere B             Scleroderma -                            Crest                           variant                         80%      XR Ankle 3+ View Right  Narrative: Three view right ankle    HISTORY: Lateral pain and swelling since falling 9/2/2022.    AP, lateral and oblique views obtained.    COMPARISON: None    FINDINGS:   Mildly displaced longitudinal or oblique fracture of the lateral  malleolus.  No dislocation.  Small spurs distal tibia.  Small calcaneal spurs.  No other osseous or articular abnormality.  Impression: CONCLUSION:  Mildly displaced longitudinal or oblique fracture of the lateral  malleolus.    [id number]    Electronically signed by:  Berny Moses MD  9/26/2022 7:27 PM CDT  Workstation: [id number]      [email or web address]  Immunization History   Administered Date(s) Administered   • COVID-19 (MODERNA) 1st, 2nd, 3rd Dose Only 09/13/2021, 10/11/2021   • Fluzone High-Dose 65+yrs 12/06/2021   • Shingrix 07/04/2021   • Tdap 08/12/2017     The following portions of the patient's history were reviewed and updated as appropriate:  allergies, current medications, past family history, past medical history, past social history, past surgical history and problem list.    PHQ-9 Total Score:           Physical Exam  Constitutional:       Appearance: Normal appearance.   HENT:      Head: Normocephalic and atraumatic.      Right Ear: External ear normal.      Left Ear: External ear normal.   Eyes:      General:         Right eye: No discharge.         Left eye: No discharge.      Conjunctiva/sclera: Conjunctivae normal.   Cardiovascular:      Rate and Rhythm: Normal rate and regular rhythm.      Pulses: Normal pulses.      Heart sounds: Normal heart sounds. No murmur heard.  Pulmonary:      Effort: Pulmonary effort is normal. No respiratory distress.      Breath sounds: Normal breath sounds.   Abdominal:      General: There is no distension.      Palpations: Abdomen is soft.      Tenderness: There is no abdominal tenderness.   Musculoskeletal:      Cervical back: Normal range of motion.      Right lower leg: No edema.      Left lower leg: No edema.   Lymphadenopathy:      Cervical: No cervical adenopathy.   Neurological:      Mental Status: She is alert. Mental status is at baseline.   Psychiatric:         Mood and Affect: Mood normal.         Behavior: Behavior normal.       Assessment & Plan    Diagnosis Plan   1. Fibromyalgia  DULoxetine (CYMBALTA) 60 MG capsule    gabapentin (NEURONTIN) 300 MG capsule         No orders of the defined types were placed in this encounter.    Fibromyalgia; well controlled on current medications, Dwaine reviewed and appropriate, no signs of abuse, misuse, no refill.  Follow-up in 3 months or sooner if needed.        This document has been electronically signed by Tone Simms MD on December 14, 2022 20:07 CST    EMR Dragon/Transciption Disclaimer: Some of this note may be an electronic transcription/translation of spoken language to printed text.  The electronic translation of spoken language may permit erroneous, or  at times, nonsensical words or phrases to be inadvertently transcribed. Although I have reviewed the note for such errors, some may still exist.

## 2022-12-28 DIAGNOSIS — G47.00 INSOMNIA, UNSPECIFIED TYPE: ICD-10-CM

## 2022-12-28 RX ORDER — TRAZODONE HYDROCHLORIDE 50 MG/1
TABLET ORAL
Qty: 90 TABLET | Refills: 1 | Status: SHIPPED | OUTPATIENT
Start: 2022-12-28

## 2022-12-28 NOTE — TELEPHONE ENCOUNTER
Rx Refill Note  Requested Prescriptions     Pending Prescriptions Disp Refills   • traZODone (DESYREL) 50 MG tablet [Pharmacy Med Name: TRAZODONE HYDROCHLORIDE 50 MG Tablet] 90 tablet 1     Sig: TAKE 1 TABLET EVERY NIGHT      Last office visit with prescribing clinician: 12/14/2022     Next office visit with prescribing clinician: 3/14/2023                         Malena Miller MA  12/28/22, 08:06 CST

## 2023-01-12 RX ORDER — SUCRALFATE 1 G/1
1 TABLET ORAL
Qty: 90 TABLET | Refills: 0 | Status: SHIPPED | OUTPATIENT
Start: 2023-01-12

## 2023-02-07 ENCOUNTER — LAB (OUTPATIENT)
Dept: LAB | Facility: HOSPITAL | Age: 73
End: 2023-02-07
Payer: MEDICARE

## 2023-02-07 ENCOUNTER — OFFICE VISIT (OUTPATIENT)
Dept: GASTROENTEROLOGY | Facility: CLINIC | Age: 73
End: 2023-02-07
Payer: MEDICARE

## 2023-02-07 VITALS
WEIGHT: 213 LBS | SYSTOLIC BLOOD PRESSURE: 130 MMHG | OXYGEN SATURATION: 98 % | BODY MASS INDEX: 35.49 KG/M2 | HEIGHT: 65 IN | DIASTOLIC BLOOD PRESSURE: 88 MMHG | HEART RATE: 91 BPM

## 2023-02-07 DIAGNOSIS — R10.10 PAIN OF UPPER ABDOMEN: ICD-10-CM

## 2023-02-07 DIAGNOSIS — Z86.010 HISTORY OF COLON POLYPS: ICD-10-CM

## 2023-02-07 DIAGNOSIS — K21.00 GASTROESOPHAGEAL REFLUX DISEASE WITH ESOPHAGITIS WITHOUT HEMORRHAGE: ICD-10-CM

## 2023-02-07 DIAGNOSIS — E53.8 FOLATE DEFICIENCY: ICD-10-CM

## 2023-02-07 DIAGNOSIS — D64.9 CHRONIC ANEMIA: Primary | ICD-10-CM

## 2023-02-07 PROCEDURE — 85025 COMPLETE CBC W/AUTO DIFF WBC: CPT | Performed by: PHYSICIAN ASSISTANT

## 2023-02-07 PROCEDURE — 85007 BL SMEAR W/DIFF WBC COUNT: CPT | Performed by: PHYSICIAN ASSISTANT

## 2023-02-07 PROCEDURE — 80053 COMPREHEN METABOLIC PANEL: CPT | Performed by: PHYSICIAN ASSISTANT

## 2023-02-07 PROCEDURE — 99214 OFFICE O/P EST MOD 30 MIN: CPT | Performed by: PHYSICIAN ASSISTANT

## 2023-02-07 PROCEDURE — 85610 PROTHROMBIN TIME: CPT | Performed by: PHYSICIAN ASSISTANT

## 2023-02-07 RX ORDER — DEXTROSE AND SODIUM CHLORIDE 5; .45 G/100ML; G/100ML
30 INJECTION, SOLUTION INTRAVENOUS CONTINUOUS PRN
Status: CANCELLED | OUTPATIENT
Start: 2023-03-06

## 2023-02-07 NOTE — PROGRESS NOTES
Chief Complaint   Patient presents with   • Follow-up     7 Month   • Heartburn   • Anemia       ENDO PROCEDURE ORDERED:    Subjective    Sofía Clarke is a 72 y.o. female. she is here today for follow-up.    History of Present Illness    Patient is seen on a recheck of her GERD, abdominal pain, anemia, beta thalassemia. Last seen on 06/14/2022. She states most of the time she is doing fairly well. She has 4 out of 10 abdominal pain today. She states in December she had had severe diarrhea but her grandson was also ill. She has been seeing Dr. De Luna and states she has been told she has a mixed connective tissue disorder. GERD generally does well on the Prilosec and Carafate. She denied nausea, vomiting or dysphagia. Bowels are moving without blood or mucus. Weight is down 15 pounds since last visit. She has a history of polyps with last colonoscopy in 2018. Last EGD with dilatation on 10/13/2020.    ASSESSMENT/PLAN:  Patient with personal history of colon polyps, due for surveillance colonoscopy. This is scheduled. We will recheck CBC, CMP, INR for her anemia and abdominal pain. We will plan follow-up in 1 month, sooner if needed. Further pending clinical course and the results of the above.      The following portions of the patient's history were reviewed and updated as appropriate:   Past Medical History:   Diagnosis Date   • Allergic    • Anemia    • Colon polyps    • Fibromyalgia, primary 2002   • GERD (gastroesophageal reflux disease)    • Headache    • Hiatal hernia    • Irritable bowel syndrome    • Neuromuscular disorder (HCC)    • Obesity 2010iyr     Past Surgical History:   Procedure Laterality Date   • CARDIAC ABLATION     • CARDIAC CATHETERIZATION     • CARDIAC SURGERY  2000    cath ablation-vein   • COLONOSCOPY  2010/2018   • ENDOSCOPY N/A 10/13/2020    Procedure: ESOPHAGOGASTRODUODENOSCOPY WITH possible DILATATION--bx;  Surgeon: Daniel Michael MD;  Location: [facility] ENDOSCOPY;  Service:  Gastroenterology;  Laterality: N/A;   • WISDOM TOOTH EXTRACTION       Family History   Problem Relation Age of Onset   • Emphysema Mother    • Dementia Mother    • Colon polyps Mother    • Cancer Mother         Colon   • COPD Mother         Emphysema   • Depression Mother         OCD   • Diabetes Father    • Cancer Father         Lung   • Hyperlipidemia Father    • No Known Problems Sister    • Hypertension Brother    • Diabetes Paternal Grandmother         Diabetes, high blood pressure   • Hyperlipidemia Paternal Grandmother    • Hypertension Paternal Grandmother    • Cancer Paternal Grandfather         Lung   • Cancer Brother         Prostate   • Other Maternal Grandfather         Lupus; autoimmune   • Hypertension Brother    • Other Sister         Lupus     OB History    No obstetric history on file.       Allergies   Allergen Reactions   • Aspirin GI Intolerance   • Clindamycin/Lincomycin GI Intolerance   • Codeine Nausea And Vomiting   • Lyrica [Pregabalin] Mental Status Change   • Penicillins GI Intolerance   • Hydroxychloroquine Rash   • Latex Rash   • Methotrexate Derivatives Rash     Social History     Socioeconomic History   • Marital status:    Tobacco Use   • Smoking status: Never   • Smokeless tobacco: Never   Vaping Use   • Vaping Use: Never used   Substance and Sexual Activity   • Alcohol use: Not Currently     Alcohol/week: 1.0 standard drink     Types: 1 Glasses of wine per week     Comment: 1986 no more at all, pregnancy planned   • Drug use: Never   • Sexual activity: Not Currently     Birth control/protection: Post-menopausal, None     Comment: Prefer male but none current     Current Medications:  Prior to Admission medications    Medication Sig Start Date End Date Taking? Authorizing Provider   acetaminophen (TYLENOL) 500 MG tablet Take 500 mg by mouth As Needed for Mild Pain .   Yes ProviderKhoi MD   ALPHA LIPOIC ACID PO Take 1 tablet by mouth Daily.   Yes ProviderKhoi  "MD   Cyanocobalamin (B-12) 1000 MCG capsule Take 1 tablet by mouth Daily.   Yes ProviderKhoi MD   DULoxetine (CYMBALTA) 60 MG capsule Take 1 capsule by mouth Daily. 12/14/22  Yes Tone Simms MD   folic acid (FOLVITE) 400 MCG tablet Take 400 mcg by mouth Daily.   Yes ProviderKhoi MD   gabapentin (NEURONTIN) 300 MG capsule Take 1 capsule by mouth Every Night. 12/14/22  Yes Tone Simms MD   omeprazole (priLOSEC) 40 MG capsule TAKE 1 CAPSULE EVERY DAY 7/5/22  Yes Monty Stringer PA-C   Probiotic Product (PROBIOTIC PO) Take 1 capsule by mouth Daily.   Yes ProviderKhoi MD   sucralfate (CARAFATE) 1 g tablet TAKE 1 TABLET BY MOUTH EVERY NIGHT AT BEDTIME. 1/12/23  Yes Monty Stringer PA-C   tiZANidine (ZANAFLEX) 4 MG tablet TAKE 1 TABLET EVERY 8 (EIGHT) HOURS AS NEEDED FOR MUSCLE SPASMS. 6/9/22  Yes Tone Simms MD   traZODone (DESYREL) 50 MG tablet TAKE 1 TABLET EVERY NIGHT 12/28/22  Yes Tone Simms MD   vitamin B-6 (PYRIDOXINE) 50 MG tablet Take 25 mg by mouth Daily.   Yes ProviderKhoi MD   VITAMIN D PO Take 1 tablet by mouth Daily.   Yes Khoi Albright MD     Review of Systems  Review of Systems       Objective    /88 (BP Location: Right arm, Patient Position: Sitting, Cuff Size: Large Adult)   Pulse 91   Ht 165.1 cm (65\")   Wt 96.6 kg (213 lb)   LMP  (LMP Unknown)   SpO2 98%   BMI 35.45 kg/m²   Physical Exam  Vitals and nursing note reviewed.   Constitutional:       General: She is not in acute distress.     Appearance: She is well-developed.   HENT:      Head: Normocephalic and atraumatic.   Eyes:      Pupils: Pupils are equal, round, and reactive to light.   Cardiovascular:      Rate and Rhythm: Normal rate and regular rhythm.      Heart sounds: Normal heart sounds.   Pulmonary:      Effort: Pulmonary effort is normal.      Breath sounds: Normal breath sounds.   Abdominal:      General: Bowel sounds are normal. There is no distension or " abdominal bruit.      Palpations: Abdomen is soft. Abdomen is not rigid. There is no shifting dullness or mass.      Tenderness: There is abdominal tenderness. There is no guarding or rebound.      Hernia: No hernia is present. There is no hernia in the ventral area.   Musculoskeletal:         General: Normal range of motion.      Cervical back: Normal range of motion.   Skin:     General: Skin is warm and dry.   Neurological:      Mental Status: She is alert and oriented to person, place, and time.   Psychiatric:         Behavior: Behavior normal.         Thought Content: Thought content normal.         Judgment: Judgment normal.       Assessment & Plan      1. Chronic anemia    2. Folate deficiency    3. Gastroesophageal reflux disease with esophagitis without hemorrhage    4. Pain of upper abdomen    5. History of colon polyps    .   Diagnoses and all orders for this visit:    1. Chronic anemia (Primary)  -     CBC & Differential  -     Comprehensive Metabolic Panel  -     Protime-INR  -     Cancel: Manual Differential  -     Manual Differential    2. Folate deficiency  -     CBC & Differential  -     Comprehensive Metabolic Panel  -     Protime-INR  -     Cancel: Manual Differential  -     Manual Differential    3. Gastroesophageal reflux disease with esophagitis without hemorrhage  -     CBC & Differential  -     Comprehensive Metabolic Panel  -     Protime-INR  -     Cancel: Manual Differential  -     Manual Differential    4. Pain of upper abdomen  -     CBC & Differential  -     Comprehensive Metabolic Panel  -     Protime-INR  -     Cancel: Manual Differential  -     Manual Differential    5. History of colon polyps  -     Case Request; Standing  -     dextrose 5 % and sodium chloride 0.45 % infusion  -     Case Request    Other orders  -     Follow Anesthesia Guidelines / Protocol; Future  -     Obtain Informed Consent; Future  -     Obtain Informed Consent; Standing  -     POC Glucose Once; Standing  -      polyethylene glycol (GoLYTELY) 236 g solution; Please use instructions given in office.  Dispense: 4000 mL; Refill: 0        Orders placed during this encounter include:  Orders Placed This Encounter   Procedures   • Comprehensive Metabolic Panel     Order Specific Question:   Release to patient     Answer:   Routine Release   • Protime-INR   • CBC Auto Differential   • Manual Differential   • Obtain Informed Consent     Standing Status:   Future     Order Specific Question:   Informed Consent Given For     Answer:   COLONOSCOPY   • CBC & Differential     Order Specific Question:   Manual Differential     Answer:   No       Medications prescribed:  New Medications Ordered This Visit   Medications   • polyethylene glycol (GoLYTELY) 236 g solution     Sig: Please use instructions given in office.     Dispense:  4000 mL     Refill:  0       Requested Prescriptions     Signed Prescriptions Disp Refills   • polyethylene glycol (GoLYTELY) 236 g solution 4000 mL 0     Sig: Please use instructions given in office.       Review and/or summary of lab tests, radiology, procedures, medications. Review and summary of old records and obtaining of history. The risks and benefits of my recommendations, as well as other treatment options were discussed with the patient today. Questions were answered.    Follow-up: Return in about 1 month (around 3/7/2023), or if symptoms worsen or fail to improve.     COLONOSCOPY (N/A)      This document has been electronically signed by Monty Stringer PA-C on February 20, 2023 18:39 CST      Results for orders placed or performed in visit on 02/07/23   CBC Auto Differential    Specimen: Blood   Result Value Ref Range    WBC 6.86 3.40 - 10.80 10*3/mm3    RBC 5.24 3.77 - 5.28 10*6/mm3    Hemoglobin 11.1 (L) 12.0 - 15.9 g/dL    Hematocrit 35.1 34.0 - 46.6 %    MCV 67.0 (L) 79.0 - 97.0 fL    MCH 21.2 (L) 26.6 - 33.0 pg    MCHC 31.6 31.5 - 35.7 g/dL    RDW 16.2 (H) 12.3 - 15.4 %    RDW-SD 37.5 37.0  - 54.0 fl    MPV 10.6 6.0 - 12.0 fL    Platelets 306 140 - 450 10*3/mm3    nRBC 0.1 0.0 - 0.2 /100 WBC   Manual Differential    Specimen: Blood   Result Value Ref Range    Neutrophil % 29.0 (L) 42.7 - 76.0 %    Lymphocyte % 57.0 (H) 19.6 - 45.3 %    Monocyte % 7.0 5.0 - 12.0 %    Eosinophil % 6.0 0.3 - 6.2 %    Basophil % 1.0 0.0 - 1.5 %    Neutrophils Absolute 1.99 1.70 - 7.00 10*3/mm3    Lymphocytes Absolute 3.91 (H) 0.70 - 3.10 10*3/mm3    Monocytes Absolute 0.48 0.10 - 0.90 10*3/mm3    Eosinophils Absolute 0.41 (H) 0.00 - 0.40 10*3/mm3    Basophils Absolute 0.07 0.00 - 0.20 10*3/mm3    Microcytes Slight/1+ None Seen    WBC Morphology Normal Normal    Platelet Morphology Normal Normal   Protime-INR    Specimen: Blood   Result Value Ref Range    Protime 24.0 (H) 11.1 - 15.3 Seconds    INR 2.14 (H) 0.80 - 1.20   Comprehensive Metabolic Panel    Specimen: Blood   Result Value Ref Range    Glucose 112 (H) 65 - 99 mg/dL    BUN 11 8 - 23 mg/dL    Creatinine 1.04 (H) 0.57 - 1.00 mg/dL    Sodium 139 136 - 145 mmol/L    Potassium 4.5 3.5 - 5.2 mmol/L    Chloride 102 98 - 107 mmol/L    CO2 25.4 22.0 - 29.0 mmol/L    Calcium 9.5 8.6 - 10.5 mg/dL    Total Protein 6.9 6.0 - 8.5 g/dL    Albumin 4.4 3.5 - 5.2 g/dL    ALT (SGPT) 16 1 - 33 U/L    AST (SGOT) 18 1 - 32 U/L    Alkaline Phosphatase 66 39 - 117 U/L    Total Bilirubin 0.7 0.0 - 1.2 mg/dL    Globulin 2.5 gm/dL    A/G Ratio 1.8 g/dL    BUN/Creatinine Ratio 10.6 7.0 - 25.0    Anion Gap 11.6 5.0 - 15.0 mmol/L    eGFR 57.6 (L) >60.0 mL/min/1.73   Results for orders placed or performed in visit on 05/12/22   DNA / DS    Specimen: Blood   Result Value Ref Range    Anti-DNA (DS) Ab Qn <1 0 - 9 IU/mL    See below: Comment    Antistreptolysin O screen    Specimen: Blood   Result Value Ref Range    ASO Negative Negative   Rheumatoid factor    Specimen: Blood   Result Value Ref Range    Rheumatoid Factor Quantitative <10.0 0.0 - 14.0 IU/mL   C-reactive protein    Specimen: Blood    Result Value Ref Range    C-Reactive Protein 0.37 0.00 - 0.50 mg/dL   KATHLEEN    Specimen: Blood   Result Value Ref Range    KATHLEEN Direct Positive (A) Negative   Uric acid    Specimen: Blood   Result Value Ref Range    Uric Acid 5.7 2.4 - 5.7 mg/dL   Results for orders placed or performed in visit on 12/13/21   Occult Blood, Fecal By Immunoassay - Stool, Per Rectum    Specimen: Per Rectum; Stool   Result Value Ref Range    Occult Blood, Fecal by Immunoassay Negative Negative   Occult Blood X 3, Stool - Stool, Per Rectum    Specimen: Per Rectum; Stool   Result Value Ref Range    Occult Blood Negative Negative    Occult Blood 2 Negative Negative    Occult Blood 3 Negative Negative    OB Date 1 12/7/2021     OB Date 2 12/8/2021     OB Date 3 12/10/2021    Results for orders placed or performed in visit on 08/12/21   Lipid Panel    Specimen: Blood   Result Value Ref Range    Total Cholesterol 143 0 - 200 mg/dL    Triglycerides 115 0 - 150 mg/dL    HDL Cholesterol 40 40 - 60 mg/dL    LDL Cholesterol  82 0 - 100 mg/dL    VLDL Cholesterol 21 5 - 40 mg/dL    LDL/HDL Ratio 2.00    Results for orders placed or performed in visit on 08/12/21   HCV Antibody Rfx To Qnt PCR    Specimen: Blood   Result Value Ref Range    Hepatitis C Ab <0.1 0.0 - 0.9 s/co ratio   Interpretation:    Specimen: Blood   Result Value Ref Range    Interpretation Comment    CBC Auto Differential    Specimen: Blood   Result Value Ref Range    WBC 5.69 3.40 - 10.80 10*3/mm3    RBC 5.04 3.77 - 5.28 10*6/mm3    Hemoglobin 10.6 (L) 12.0 - 15.9 g/dL    Hematocrit 34.6 34.0 - 46.6 %    MCV 68.7 (L) 79.0 - 97.0 fL    MCH 21.0 (L) 26.6 - 33.0 pg    MCHC 30.6 (L) 31.5 - 35.7 g/dL    RDW 16.3 (H) 12.3 - 15.4 %    RDW-SD 38.6 37.0 - 54.0 fl    MPV 10.4 6.0 - 12.0 fL    Platelets 301 140 - 450 10*3/mm3   Iron Profile    Specimen: Blood   Result Value Ref Range    Iron 154 (H) 37 - 145 mcg/dL    Iron Saturation 46 20 - 50 %    Transferrin 224 200 - 360 mg/dL    TIBC 334  298 - 536 mcg/dL   Manual Differential    Specimen: Blood   Result Value Ref Range    Neutrophil % 44.3 42.7 - 76.0 %    Lymphocyte % 50.5 (H) 19.6 - 45.3 %    Monocyte % 3.1 (L) 5.0 - 12.0 %    Eosinophil % 1.0 0.3 - 6.2 %    Basophil % 1.0 0.0 - 1.5 %    Neutrophils Absolute 2.52 1.70 - 7.00 10*3/mm3    Lymphocytes Absolute 2.87 0.70 - 3.10 10*3/mm3    Monocytes Absolute 0.18 0.10 - 0.90 10*3/mm3    Eosinophils Absolute 0.06 0.00 - 0.40 10*3/mm3    Basophils Absolute 0.06 0.00 - 0.20 10*3/mm3    Anisocytosis Mod/2+ None Seen    Basophilic Stippling Slight/1+ None Seen    Brennen Cells Mod/2+ None Seen    Microcytes Slight/1+ None Seen    Poikilocytes Mod/2+ None Seen    Smudge Cells Slight/1+ None Seen    Platelet Morphology Normal Normal     *Note: Due to a large number of results and/or encounters for the requested time period, some results have not been displayed. A complete set of results can be found in Results Review.

## 2023-02-08 LAB
ALBUMIN SERPL-MCNC: 4.4 G/DL (ref 3.5–5.2)
ALBUMIN/GLOB SERPL: 1.8 G/DL
ALP SERPL-CCNC: 66 U/L (ref 39–117)
ALT SERPL W P-5'-P-CCNC: 16 U/L (ref 1–33)
ANION GAP SERPL CALCULATED.3IONS-SCNC: 11.6 MMOL/L (ref 5–15)
AST SERPL-CCNC: 18 U/L (ref 1–32)
BILIRUB SERPL-MCNC: 0.7 MG/DL (ref 0–1.2)
BUN SERPL-MCNC: 11 MG/DL (ref 8–23)
BUN/CREAT SERPL: 10.6 (ref 7–25)
CALCIUM SPEC-SCNC: 9.5 MG/DL (ref 8.6–10.5)
CHLORIDE SERPL-SCNC: 102 MMOL/L (ref 98–107)
CO2 SERPL-SCNC: 25.4 MMOL/L (ref 22–29)
CREAT SERPL-MCNC: 1.04 MG/DL (ref 0.57–1)
DEPRECATED RDW RBC AUTO: 37.5 FL (ref 37–54)
EGFRCR SERPLBLD CKD-EPI 2021: 57.6 ML/MIN/1.73
ERYTHROCYTE [DISTWIDTH] IN BLOOD BY AUTOMATED COUNT: 16.2 % (ref 12.3–15.4)
GLOBULIN UR ELPH-MCNC: 2.5 GM/DL
GLUCOSE SERPL-MCNC: 112 MG/DL (ref 65–99)
HCT VFR BLD AUTO: 35.1 % (ref 34–46.6)
HGB BLD-MCNC: 11.1 G/DL (ref 12–15.9)
INR PPP: 2.14 (ref 0.8–1.2)
MCH RBC QN AUTO: 21.2 PG (ref 26.6–33)
MCHC RBC AUTO-ENTMCNC: 31.6 G/DL (ref 31.5–35.7)
MCV RBC AUTO: 67 FL (ref 79–97)
NRBC BLD AUTO-RTO: 0.1 /100 WBC (ref 0–0.2)
PLATELET # BLD AUTO: 306 10*3/MM3 (ref 140–450)
PMV BLD AUTO: 10.6 FL (ref 6–12)
POTASSIUM SERPL-SCNC: 4.5 MMOL/L (ref 3.5–5.2)
PROT SERPL-MCNC: 6.9 G/DL (ref 6–8.5)
PROTHROMBIN TIME: 24 SECONDS (ref 11.1–15.3)
RBC # BLD AUTO: 5.24 10*6/MM3 (ref 3.77–5.28)
SODIUM SERPL-SCNC: 139 MMOL/L (ref 136–145)
WBC NRBC COR # BLD: 6.86 10*3/MM3 (ref 3.4–10.8)

## 2023-02-09 LAB
BASOPHILS # BLD MANUAL: 0.07 10*3/MM3 (ref 0–0.2)
BASOPHILS NFR BLD MANUAL: 1 % (ref 0–1.5)
EOSINOPHIL # BLD MANUAL: 0.41 10*3/MM3 (ref 0–0.4)
EOSINOPHIL NFR BLD MANUAL: 6 % (ref 0.3–6.2)
LYMPHOCYTES # BLD MANUAL: 3.91 10*3/MM3 (ref 0.7–3.1)
LYMPHOCYTES NFR BLD MANUAL: 7 % (ref 5–12)
MICROCYTES BLD QL: ABNORMAL
MONOCYTES # BLD: 0.48 10*3/MM3 (ref 0.1–0.9)
NEUTROPHILS # BLD AUTO: 1.99 10*3/MM3 (ref 1.7–7)
NEUTROPHILS NFR BLD MANUAL: 29 % (ref 42.7–76)
PLAT MORPH BLD: NORMAL
VARIANT LYMPHS NFR BLD MANUAL: 57 % (ref 19.6–45.3)
WBC MORPH BLD: NORMAL

## 2023-02-13 RX ORDER — OMEPRAZOLE 40 MG/1
CAPSULE, DELAYED RELEASE ORAL
Qty: 90 CAPSULE | Refills: 1 | Status: SHIPPED | OUTPATIENT
Start: 2023-02-13

## 2023-03-06 ENCOUNTER — ANESTHESIA (OUTPATIENT)
Dept: GASTROENTEROLOGY | Facility: HOSPITAL | Age: 73
End: 2023-03-06
Payer: MEDICARE

## 2023-03-06 ENCOUNTER — ANESTHESIA EVENT (OUTPATIENT)
Dept: GASTROENTEROLOGY | Facility: HOSPITAL | Age: 73
End: 2023-03-06
Payer: MEDICARE

## 2023-03-06 ENCOUNTER — HOSPITAL ENCOUNTER (OUTPATIENT)
Facility: HOSPITAL | Age: 73
Setting detail: HOSPITAL OUTPATIENT SURGERY
Discharge: HOME | End: 2023-03-06
Attending: SURGERY | Admitting: INTERNAL MEDICINE
Payer: MEDICARE

## 2023-03-06 VITALS
TEMPERATURE: 97.3 F | HEART RATE: 91 BPM | RESPIRATION RATE: 16 BRPM | OXYGEN SATURATION: 97 % | WEIGHT: 217.4 LBS | HEIGHT: 65 IN | DIASTOLIC BLOOD PRESSURE: 58 MMHG | SYSTOLIC BLOOD PRESSURE: 104 MMHG | BODY MASS INDEX: 36.22 KG/M2

## 2023-03-06 DIAGNOSIS — Z86.010 HISTORY OF COLON POLYPS: ICD-10-CM

## 2023-03-06 PROCEDURE — 25010000002 PROPOFOL 10 MG/ML EMULSION: Performed by: NURSE ANESTHETIST, CERTIFIED REGISTERED

## 2023-03-06 PROCEDURE — 88305 TISSUE EXAM BY PATHOLOGIST: CPT

## 2023-03-06 RX ORDER — PROPOFOL 10 MG/ML
VIAL (ML) INTRAVENOUS AS NEEDED
Status: DISCONTINUED | OUTPATIENT
Start: 2023-03-06 | End: 2023-03-06 | Stop reason: SURG

## 2023-03-06 RX ORDER — DEXTROSE AND SODIUM CHLORIDE 5; .45 G/100ML; G/100ML
30 INJECTION, SOLUTION INTRAVENOUS CONTINUOUS PRN
Status: DISCONTINUED | OUTPATIENT
Start: 2023-03-06 | End: 2023-03-06 | Stop reason: HOSPADM

## 2023-03-06 RX ADMIN — PROPOFOL 20 MG: 10 INJECTION, EMULSION INTRAVENOUS at 10:46

## 2023-03-06 RX ADMIN — PROPOFOL 20 MG: 10 INJECTION, EMULSION INTRAVENOUS at 10:44

## 2023-03-06 RX ADMIN — DEXTROSE AND SODIUM CHLORIDE 30 ML/HR: 5; 450 INJECTION, SOLUTION INTRAVENOUS at 10:25

## 2023-03-06 RX ADMIN — PROPOFOL 20 MG: 10 INJECTION, EMULSION INTRAVENOUS at 11:00

## 2023-03-06 RX ADMIN — PROPOFOL 50 MG: 10 INJECTION, EMULSION INTRAVENOUS at 10:47

## 2023-03-06 RX ADMIN — PROPOFOL 40 MG: 10 INJECTION, EMULSION INTRAVENOUS at 10:52

## 2023-03-06 RX ADMIN — PROPOFOL 20 MG: 10 INJECTION, EMULSION INTRAVENOUS at 10:50

## 2023-03-06 RX ADMIN — PROPOFOL 60 MG: 10 INJECTION, EMULSION INTRAVENOUS at 10:42

## 2023-03-06 NOTE — ANESTHESIA POSTPROCEDURE EVALUATION
Patient: Sofía Clarke    Procedure Summary     Date: 03/06/23 Room / Location: [facility] ENDOSCOPY 2 / [facility] ENDOSCOPY    Anesthesia Start: 1037 Anesthesia Stop: 1106    Procedure: COLONOSCOPY Diagnosis:       History of colon polyps      (History of colon polyps [Z86.010])    Surgeons: Giuseppe Munguia MD Provider: Conchita Murray CRNA    Anesthesia Type: general ASA Status: 3          Anesthesia Type: general    Vitals  No vitals data found for the desired time range.          Post Anesthesia Care and Evaluation    Patient location during evaluation: bedside  Patient participation: complete - patient participated  Level of consciousness: awake and awake and alert  Pain score: 0  Pain management: adequate    Airway patency: patent  Anesthetic complications: No anesthetic complications  PONV Status: none  Cardiovascular status: acceptable and stable  Respiratory status: acceptable, room air and spontaneous ventilation  Hydration status: acceptable

## 2023-03-06 NOTE — ANESTHESIA PREPROCEDURE EVALUATION
Anesthesia Evaluation     Patient summary reviewed and Nursing notes reviewed   NPO Solid Status: > 8 hours  NPO Liquid Status: > 2 hours           Airway   Mallampati: II  TM distance: >3 FB  Neck ROM: full  No difficulty expected  Dental    (+) edentulous    Pulmonary - normal exam   Cardiovascular - normal exam    ECG reviewed  Rhythm: regular  Rate: normal    (+) dysrhythmias Atrial Fib,     ROS comment: Hx a fib    Neuro/Psych  (+) headaches, numbness,    GI/Hepatic/Renal/Endo    (+) obesity,  hiatal hernia, GERD,      Musculoskeletal     (+) myalgias,   Abdominal   (+) obese,    Substance History      OB/GYN          Other                      Anesthesia Plan    ASA 3     general   total IV anesthesia  intravenous induction     Anesthetic plan, risks, benefits, and alternatives have been provided, discussed and informed consent has been obtained with: patient.        CODE STATUS:

## 2023-03-06 NOTE — H&P
No chief complaint on file.      Sofía Clarke is a 72 y.o. female referred today for evaluation for colonoscopy.  She notes no change in bowel habits, no blood in the stool.     Prior Colonoscopy:yes  Prior Polyps:yes  Family History of Colon Cancer:no  On anticoagulation:no    Past Surgical History:   Procedure Laterality Date   • CARDIAC ABLATION     • CARDIAC CATHETERIZATION     • CARDIAC SURGERY  2000    cath ablation-vein   • COLONOSCOPY  2010/2018   • ENDOSCOPY N/A 10/13/2020    Procedure: ESOPHAGOGASTRODUODENOSCOPY WITH possible DILATATION--bx;  Surgeon: Daniel Michael MD;  Location: [facility] ENDOSCOPY;  Service: Gastroenterology;  Laterality: N/A;   • WISDOM TOOTH EXTRACTION       Past Medical History:   Diagnosis Date   • Allergic    • Anemia    • Colon polyps    • Connective tissue disease (HCC)    • Fibromyalgia, primary 2002   • GERD (gastroesophageal reflux disease)    • Headache    • Hiatal hernia    • Irritable bowel syndrome    • Neuromuscular disorder (HCC)    • Obesity 2010iyr   • Restless leg syndrome      Social History     Socioeconomic History   • Marital status:    Tobacco Use   • Smoking status: Never   • Smokeless tobacco: Never   Vaping Use   • Vaping Use: Never used   Substance and Sexual Activity   • Alcohol use: Not Currently     Alcohol/week: 1.0 standard drink     Types: 1 Glasses of wine per week     Comment: 1986 no more at all, pregnancy planned   • Drug use: Never   • Sexual activity: Not Currently     Birth control/protection: Post-menopausal, None     Comment: Prefer male but none current     Family History   Problem Relation Age of Onset   • Emphysema Mother    • Dementia Mother    • Colon polyps Mother    • Cancer Mother         Colon   • COPD Mother         Emphysema   • Depression Mother         OCD   • Diabetes Father    • Cancer Father         Lung   • Hyperlipidemia Father    • No Known Problems Sister    • Hypertension Brother    • Diabetes Paternal  Grandmother         Diabetes, high blood pressure   • Hyperlipidemia Paternal Grandmother    • Hypertension Paternal Grandmother    • Cancer Paternal Grandfather         Lung   • Cancer Brother         Prostate   • Other Maternal Grandfather         Lupus; autoimmune   • Hypertension Brother    • Other Sister         Lupus     Allergies   Allergen Reactions   • Aspirin GI Intolerance   • Clindamycin/Lincomycin GI Intolerance   • Codeine Nausea And Vomiting   • Lyrica [Pregabalin] Mental Status Change   • Penicillins GI Intolerance   • Hydroxychloroquine Rash   • Latex Rash   • Methotrexate Derivatives Rash       Home Medications:  Prior to Admission medications    Medication Sig Start Date End Date Taking? Authorizing Provider   acetaminophen (TYLENOL) 500 MG tablet Take 1 tablet by mouth As Needed for Mild Pain.   Yes ProviderKhoi MD   ALPHA LIPOIC ACID PO Take 1 tablet by mouth Daily.   Yes ProviderKhoi MD   Cyanocobalamin (B-12) 1000 MCG capsule Take 1 tablet by mouth Daily.   Yes ProviderKhoi MD   DULoxetine (CYMBALTA) 60 MG capsule Take 1 capsule by mouth Daily. 12/14/22  Yes Tone Simms MD   folic acid (FOLVITE) 400 MCG tablet Take 1 tablet by mouth Daily.   Yes ProviderKhoi MD   gabapentin (NEURONTIN) 300 MG capsule Take 1 capsule by mouth Every Night. 12/14/22  Yes Tone Simms MD   omeprazole (priLOSEC) 40 MG capsule TAKE 1 CAPSULE EVERY DAY 2/13/23  Yes Monty Stringer PA-C   Probiotic Product (PROBIOTIC PO) Take 1 capsule by mouth Daily.   Yes ProviderKhoi MD   sucralfate (CARAFATE) 1 g tablet TAKE 1 TABLET BY MOUTH EVERY NIGHT AT BEDTIME. 1/12/23  Yes Monty Stringer PA-C   tiZANidine (ZANAFLEX) 4 MG tablet TAKE 1 TABLET EVERY 8 (EIGHT) HOURS AS NEEDED FOR MUSCLE SPASMS. 6/9/22  Yes Tone Simms MD   traZODone (DESYREL) 50 MG tablet TAKE 1 TABLET EVERY NIGHT 12/28/22  Yes Tone Simms MD   vitamin B-6 (PYRIDOXINE) 50 MG tablet Take 25  mg by mouth Daily.   Yes Provider, MD Khoi   VITAMIN D PO Take 1 tablet by mouth Daily.   Yes Provider, MD Khoi   polyethylene glycol (GoLYTELY) 236 g solution Please use instructions given in office. 2/7/23   Monty Stringer PA-C       Review of Systems   Constitutional: Negative for activity change, appetite change and chills.   HENT: Negative for congestion.    Respiratory: Negative for apnea, choking, chest tightness, shortness of breath and stridor.    Cardiovascular: Negative for chest pain.   Gastrointestinal: Negative for abdominal distention, abdominal pain, blood in stool, constipation, diarrhea, nausea, rectal pain and vomiting.   Genitourinary: Negative for difficulty urinating.   Musculoskeletal: Negative for arthralgias and back pain.   Neurological: Negative for dizziness, seizures and facial asymmetry.   Psychiatric/Behavioral: Negative for agitation, behavioral problems and confusion.       Vitals:    03/06/23 1015   BP: 150/75   Pulse: 100   Resp: 20   Temp: 96.6 °F (35.9 °C)   SpO2: 99%       Physical Exam  Constitutional:       General: She is not in acute distress.     Appearance: She is well-developed.   HENT:      Head: Normocephalic and atraumatic.   Eyes:      General: No scleral icterus.     Conjunctiva/sclera: Conjunctivae normal.   Neck:      Thyroid: No thyromegaly.      Trachea: No tracheal deviation.   Cardiovascular:      Rate and Rhythm: Normal rate and regular rhythm.      Heart sounds: Normal heart sounds. No murmur heard.    No friction rub. No gallop.   Pulmonary:      Effort: Pulmonary effort is normal. No respiratory distress.      Breath sounds: Normal breath sounds. No stridor. No wheezing or rales.   Chest:      Chest wall: No tenderness.   Abdominal:      General: Bowel sounds are normal. There is no distension.      Palpations: Abdomen is soft. There is no mass.      Tenderness: There is no abdominal tenderness. There is no guarding or rebound.      Hernia:  No hernia is present.   Musculoskeletal:         General: No deformity. Normal range of motion.      Cervical back: Normal range of motion and neck supple.   Lymphadenopathy:      Cervical: No cervical adenopathy.   Skin:     General: Skin is warm and dry.      Coloration: Skin is not pale.      Findings: No erythema or rash.      Nails: There is no clubbing.   Neurological:      Mental Status: She is alert and oriented to person, place, and time.   Psychiatric:         Behavior: Behavior normal.         Thought Content: Thought content normal.         Assessment     In need of screening colonoscopy.    Plan     Risks, benefits, rationale and prep for colonoscopy have been discussed with the patient.  The patient indicates understanding of these issues and agrees with the plan.

## 2023-03-08 LAB — REF LAB TEST METHOD: NORMAL

## 2023-03-09 RX ORDER — TIZANIDINE 4 MG/1
TABLET ORAL
Qty: 90 TABLET | Refills: 3 | Status: SHIPPED | OUTPATIENT
Start: 2023-03-09

## 2023-03-27 ENCOUNTER — OFFICE VISIT (OUTPATIENT)
Dept: FAMILY MEDICINE CLINIC | Facility: CLINIC | Age: 73
End: 2023-03-27
Payer: MEDICARE

## 2023-03-27 VITALS
HEIGHT: 65 IN | OXYGEN SATURATION: 97 % | TEMPERATURE: 96.9 F | HEART RATE: 96 BPM | BODY MASS INDEX: 36.82 KG/M2 | WEIGHT: 221 LBS | DIASTOLIC BLOOD PRESSURE: 80 MMHG | SYSTOLIC BLOOD PRESSURE: 120 MMHG

## 2023-03-27 DIAGNOSIS — M79.7 FIBROMYALGIA: ICD-10-CM

## 2023-03-27 DIAGNOSIS — Z79.899 HIGH RISK MEDICATION USE: Primary | ICD-10-CM

## 2023-03-27 RX ORDER — GABAPENTIN 300 MG/1
300 CAPSULE ORAL NIGHTLY
Qty: 90 CAPSULE | Refills: 0 | Status: SHIPPED | OUTPATIENT
Start: 2023-03-27

## 2023-03-27 NOTE — PROGRESS NOTES
"Subjective:  Sofía Clarke is a 72 y.o. female who presents for     Fibromyalgia; Is currently on Cymbalta 60 mg daily, gabapentin 300 mg nightly. States that medication is providing good relief, denied any SI/HI/AV hallucinations, mood disturbance or sleep disturbance.  Denied increased pain, fatigue, increased use, or other adverse effects of medication.  Happy with current management, needs refills today.      Patient Active Problem List   Diagnosis   • Morbidly obese (HCC)   • Gastroesophageal reflux disease   • Nausea   • Dysphagia   • Multiple food allergies   • Fibromyalgia   • Autoimmune disease (HCC)   • Irritable bowel syndrome   • Thalassemia   • History of colon polyps     Vitals:    Vitals:    03/27/23 1119   BP: 120/80   Pulse: 96   Temp: 96.9 °F (36.1 °C)   SpO2: 97%   Weight: 100 kg (221 lb)   Height: 165.1 cm (65\")     Body mass index is 36.78 kg/m².      Current Outpatient Medications:   •  gabapentin (NEURONTIN) 300 MG capsule, Take 1 capsule by mouth Every Night., Disp: 90 capsule, Rfl: 0  •  acetaminophen (TYLENOL) 500 MG tablet, Take 1 tablet by mouth As Needed for Mild Pain., Disp: , Rfl:   •  ALPHA LIPOIC ACID PO, Take 1 tablet by mouth Daily., Disp: , Rfl:   •  Cyanocobalamin (B-12) 1000 MCG capsule, Take 1 tablet by mouth Daily., Disp: , Rfl:   •  DULoxetine (CYMBALTA) 60 MG capsule, Take 1 capsule by mouth Daily., Disp: 90 capsule, Rfl: 3  •  folic acid (FOLVITE) 400 MCG tablet, Take 1 tablet by mouth Daily., Disp: , Rfl:   •  omeprazole (priLOSEC) 40 MG capsule, TAKE 1 CAPSULE EVERY DAY, Disp: 90 capsule, Rfl: 1  •  Probiotic Product (PROBIOTIC PO), Take 1 capsule by mouth Daily., Disp: , Rfl:   •  sucralfate (CARAFATE) 1 g tablet, TAKE 1 TABLET BY MOUTH EVERY NIGHT AT BEDTIME., Disp: 90 tablet, Rfl: 0  •  tiZANidine (ZANAFLEX) 4 MG tablet, TAKE 1 TABLET EVERY 8 (EIGHT) HOURS AS NEEDED FOR MUSCLE SPASMS., Disp: 90 tablet, Rfl: 3  •  traZODone (DESYREL) 50 MG tablet, TAKE 1 TABLET " EVERY NIGHT, Disp: 90 tablet, Rfl: 1  •  vitamin B-6 (PYRIDOXINE) 50 MG tablet, Take 25 mg by mouth Daily., Disp: , Rfl:   •  VITAMIN D PO, Take 1 tablet by mouth Daily., Disp: , Rfl:     Patient Active Problem List   Diagnosis   • Morbidly obese (HCC)   • Gastroesophageal reflux disease   • Nausea   • Dysphagia   • Multiple food allergies   • Fibromyalgia   • Autoimmune disease (HCC)   • Irritable bowel syndrome   • Thalassemia   • History of colon polyps     Past Surgical History:   Procedure Laterality Date   • CARDIAC ABLATION     • CARDIAC CATHETERIZATION     • CARDIAC SURGERY  2000    cath ablation-vein   • COLONOSCOPY  2010/2018   • COLONOSCOPY N/A 3/6/2023    Procedure: COLONOSCOPY;  Surgeon: Giuseppe Munguia MD;  Location: [facility] ENDOSCOPY;  Service: Gastroenterology;  Laterality: N/A;   • ENDOSCOPY N/A 10/13/2020    Procedure: ESOPHAGOGASTRODUODENOSCOPY WITH possible DILATATION--bx;  Surgeon: Daniel Michael MD;  Location: [facility] ENDOSCOPY;  Service: Gastroenterology;  Laterality: N/A;   • WISDOM TOOTH EXTRACTION       Social History     Socioeconomic History   • Marital status:    Tobacco Use   • Smoking status: Never   • Smokeless tobacco: Never   Vaping Use   • Vaping Use: Never used   Substance and Sexual Activity   • Alcohol use: Not Currently     Alcohol/week: 1.0 standard drink     Types: 1 Glasses of wine per week     Comment: 1986 no more at all, pregnancy planned   • Drug use: Never   • Sexual activity: Not Currently     Birth control/protection: Post-menopausal, None     Comment: Prefer male but none current     Family History   Problem Relation Age of Onset   • Emphysema Mother    • Dementia Mother    • Colon polyps Mother    • Cancer Mother         Colon   • COPD Mother         Emphysema   • Depression Mother         OCD   • Diabetes Father    • Cancer Father         Lung   • Hyperlipidemia Father    • No Known Problems Sister    • Hypertension Brother    • Diabetes Paternal  Grandmother         Diabetes, high blood pressure   • Hyperlipidemia Paternal Grandmother    • Hypertension Paternal Grandmother    • Cancer Paternal Grandfather         Lung   • Cancer Brother         Prostate   • Other Maternal Grandfather         Lupus; autoimmune   • Hypertension Brother    • Other Sister         Lupus     Admission on 2023, Discharged on 2023   Component Date Value Ref Range Status   • Reference Lab Report 2023    Final                    Value:[facility]  [address] [place] [address]  Phone: [phone] or [phone]  Fax: [phone]  Dixon Mendoza M.D., Medical Director    PATIENT NAME                                     LABORATORY NO.  [id number]   PADMINI SEGAL.                           [id number]  [id number]                                 AGE                    SEX   SSN              CLIENT REF #  [facility]                   72        1950      F     [id number]      [id number]    [place]                               REQUESTING M.D.           ATTENDING M.D.         COPY TO.  [address] [place] [address] [place] [address]                     DATE COLLECTED            DATE RECEIVED          DATE REPORTED  2023    DIAGNOSIS:  SIGMOID COLON POLYP:  Tubular adenoma  Negative for carcinoma or high-grade dysplasia    CLINICAL                           HISTORY:  History of colon polyps    SPECIMENS RECEIVED:  SIGMOID COLON POLYP    MICROSCOPIC DESCRIPTION:  Tissue blocks are prepared and slides are examined microscopically on all  specimens. See diagnosis for details.    Professional interpretation rendered by Kian Pinto M.D.,F.C.A.P. at [facility]  [employer], [facility], [address] [place] [address].    GROSS DESCRIPTION:  Labeled sigmoid colon polyp is a 0.4 x 0.3 x 0.3 cm portion of tan soft  tissue,  submitted entirely in 1 block.  MTH    REVIEWED, DIAGNOSED AND ELECTRONICALLY  SIGNED BY:    Kian Pinto M.D.,F.C.A.P.  CPT CODES:  27740     Office Visit on 02/07/2023   Component Date Value Ref Range Status   • Glucose 02/07/2023 112 (H)  65 - 99 mg/dL Final   • BUN 02/07/2023 11  8 - 23 mg/dL Final   • Creatinine 02/07/2023 1.04 (H)  0.57 - 1.00 mg/dL Final   • Sodium 02/07/2023 139  136 - 145 mmol/L Final   • Potassium 02/07/2023 4.5  3.5 - 5.2 mmol/L Final   • Chloride 02/07/2023 102  98 - 107 mmol/L Final   • CO2 02/07/2023 25.4  22.0 - 29.0 mmol/L Final   • Calcium 02/07/2023 9.5  8.6 - 10.5 mg/dL Final   • Total Protein 02/07/2023 6.9  6.0 - 8.5 g/dL Final   • Albumin 02/07/2023 4.4  3.5 - 5.2 g/dL Final   • ALT (SGPT) 02/07/2023 16  1 - 33 U/L Final   • AST (SGOT) 02/07/2023 18  1 - 32 U/L Final   • Alkaline Phosphatase 02/07/2023 66  39 - 117 U/L Final   • Total Bilirubin 02/07/2023 0.7  0.0 - 1.2 mg/dL Final   • Globulin 02/07/2023 2.5  gm/dL Final   • A/G Ratio 02/07/2023 1.8  g/dL Final   • BUN/Creatinine Ratio 02/07/2023 10.6  7.0 - 25.0 Final   • Anion Gap 02/07/2023 11.6  5.0 - 15.0 mmol/L Final   • eGFR 02/07/2023 57.6 (L)  >60.0 mL/min/1.73 Final   • Protime 02/07/2023 24.0 (H)  11.1 - 15.3 Seconds Final   • INR 02/07/2023 2.14 (H)  0.80 - 1.20 Final   • WBC 02/07/2023 6.86  3.40 - 10.80 10*3/mm3 Final   • RBC 02/07/2023 5.24  3.77 - 5.28 10*6/mm3 Final   • Hemoglobin 02/07/2023 11.1 (L)  12.0 - 15.9 g/dL Final   • Hematocrit 02/07/2023 35.1  34.0 - 46.6 % Final   • MCV 02/07/2023 67.0 (L)  79.0 - 97.0 fL Final   • MCH 02/07/2023 21.2 (L)  26.6 - 33.0 pg Final   • MCHC 02/07/2023 31.6  31.5 - 35.7 g/dL Final   • RDW 02/07/2023 16.2 (H)  12.3 - 15.4 % Final   • RDW-SD 02/07/2023 37.5  37.0 - 54.0 fl Final   • MPV 02/07/2023 10.6  6.0 - 12.0 fL Final   • Platelets 02/07/2023 306  140 - 450 10*3/mm3 Final   • nRBC 02/07/2023 0.1  0.0 - 0.2 /100 WBC Final   • Neutrophil % 02/07/2023 29.0  (L)  42.7 - 76.0 % Final   • Lymphocyte % 02/07/2023 57.0 (H)  19.6 - 45.3 % Final   • Monocyte % 02/07/2023 7.0  5.0 - 12.0 % Final   • Eosinophil % 02/07/2023 6.0  0.3 - 6.2 % Final   • Basophil % 02/07/2023 1.0  0.0 - 1.5 % Final   • Neutrophils Absolute 02/07/2023 1.99  1.70 - 7.00 10*3/mm3 Final   • Lymphocytes Absolute 02/07/2023 3.91 (H)  0.70 - 3.10 10*3/mm3 Final   • Monocytes Absolute 02/07/2023 0.48  0.10 - 0.90 10*3/mm3 Final   • Eosinophils Absolute 02/07/2023 0.41 (H)  0.00 - 0.40 10*3/mm3 Final   • Basophils Absolute 02/07/2023 0.07  0.00 - 0.20 10*3/mm3 Final   • Microcytes 02/07/2023 Slight/1+  None Seen Final   • WBC Morphology 02/07/2023 Normal  Normal Final   • Platelet Morphology 02/07/2023 Normal  Normal Final      XR Ankle 3+ View Right  Narrative: Three view right ankle    HISTORY: Lateral pain and swelling since falling 9/2/2022.    AP, lateral and oblique views obtained.    COMPARISON: None    FINDINGS:   Mildly displaced longitudinal or oblique fracture of the lateral  malleolus.  No dislocation.  Small spurs distal tibia.  Small calcaneal spurs.  No other osseous or articular abnormality.  Impression: CONCLUSION:  Mildly displaced longitudinal or oblique fracture of the lateral  malleolus.    [id number]    Electronically signed by:  Berny Moses MD  9/26/2022 7:27 PM CDT  Workstation: [id number]      @[email or web address]@  Immunization History   Administered Date(s) Administered   • COVID-19 (MODERNA) 1st, 2nd, 3rd Dose Only 09/13/2021, 10/11/2021   • Fluzone High-Dose 65+yrs 12/06/2021   • Shingrix 07/04/2021   • Tdap 08/12/2017     The following portions of the patient's history were reviewed and updated as appropriate: allergies, current medications, past family history, past medical history, past social history, past surgical history and problem list.    PHQ-9 Total Score: 9           Physical Exam  Constitutional:       Appearance: Normal appearance.   HENT:      Head: Normocephalic and atraumatic.       Right Ear: External ear normal.      Left Ear: External ear normal.   Eyes:      General:         Right eye: No discharge.         Left eye: No discharge.      Conjunctiva/sclera: Conjunctivae normal.   Cardiovascular:      Rate and Rhythm: Normal rate and regular rhythm.      Pulses: Normal pulses.      Heart sounds: Normal heart sounds. No murmur heard.  Pulmonary:      Effort: Pulmonary effort is normal. No respiratory distress.      Breath sounds: Normal breath sounds.   Abdominal:      General: There is no distension.      Palpations: Abdomen is soft.      Tenderness: There is no abdominal tenderness.   Musculoskeletal:      Cervical back: Normal range of motion.      Right lower leg: No edema.      Left lower leg: No edema.   Lymphadenopathy:      Cervical: No cervical adenopathy.   Neurological:      Mental Status: She is alert. Mental status is at baseline.   Psychiatric:         Mood and Affect: Mood normal.         Behavior: Behavior normal.         Assessment & Plan    Diagnosis Plan   1. High risk medication use        2. Fibromyalgia  gabapentin (NEURONTIN) 300 MG capsule         No orders of the defined types were placed in this encounter.    Fibromyalgia; doing well on current medication, denied any adverse effects, reassuring.  [place]aine reviewed and appropriate, no signs of abuse, misuse, labs up-to-date, will refill.          This document has been electronically signed by Tone Simms MD on March 27, 2023 15:11 CDT    EMR Dragon/Transciption Disclaimer: Some of this note may be an electronic transcription/translation of spoken language to printed text.  The electronic translation of spoken language may permit erroneous, or at times, nonsensical words or phrases to be inadvertently transcribed. Although I have reviewed the note for such errors, some may still exist.

## 2023-05-16 ENCOUNTER — OFFICE VISIT (OUTPATIENT)
Dept: GASTROENTEROLOGY | Facility: CLINIC | Age: 73
End: 2023-05-16

## 2023-05-16 VITALS
WEIGHT: 219 LBS | HEART RATE: 92 BPM | DIASTOLIC BLOOD PRESSURE: 85 MMHG | BODY MASS INDEX: 36.49 KG/M2 | OXYGEN SATURATION: 98 % | HEIGHT: 65 IN | SYSTOLIC BLOOD PRESSURE: 135 MMHG

## 2023-05-16 DIAGNOSIS — D64.9 CHRONIC ANEMIA: Primary | ICD-10-CM

## 2023-05-16 DIAGNOSIS — K21.00 GASTROESOPHAGEAL REFLUX DISEASE WITH ESOPHAGITIS WITHOUT HEMORRHAGE: ICD-10-CM

## 2023-05-16 DIAGNOSIS — R10.10 PAIN OF UPPER ABDOMEN: ICD-10-CM

## 2023-05-16 PROCEDURE — 1159F MED LIST DOCD IN RCRD: CPT | Performed by: PHYSICIAN ASSISTANT

## 2023-05-16 PROCEDURE — 1160F RVW MEDS BY RX/DR IN RCRD: CPT | Performed by: PHYSICIAN ASSISTANT

## 2023-05-16 PROCEDURE — 99214 OFFICE O/P EST MOD 30 MIN: CPT | Performed by: PHYSICIAN ASSISTANT

## 2023-05-16 NOTE — PROGRESS NOTES
Chief Complaint   Patient presents with   • chronic anemia   • hx of colon polyps    • follow up after endo        ENDO PROCEDURE ORDERED:    Subjective    Sofía Clarke is a 73 y.o. female. she is here today for follow-up.    History of Present Illness    Patient is seen on a recheck of her chronic anemia with history of polyps, beta thalassemia. Last seen on 02/07/2023. CBC showed a hemoglobin of 11.1 with an MCV of 67, INR of 2.14. CMP showed a glucose of 112, creatinine 1.04. GFR 57.6, otherwise normal. The patient currently denies significant abdominal pain. GERD is doing well on the Prilosec and Carafate. Denied nausea or vomiting. Bowels are moving without blood or mucus. Weight is up 6 pounds since last visit. Patient underwent colonoscopy; it ended up being done by Dr. Munguia on 03/06/2023. Showed a 7 mm sigmoid polyp that was pedunculated that was removed with cold snare. This was a tubular adenoma, 4 mm in greatest dimension. Retroflexion of the anal verge showed no significant abnormality. Prep was good. Recommended repeat colonoscopy at 3 years. She does have personal history of polyps. Prior EGD with dilatation on 10/13/2020.     ASSESSMENT/PLAN:  Patient with persistent anemia improved. May be related to her beta thalassemia. Currently she is doing well. Encouraged to continue dietary modification, weight loss, avoiding gastric irritants. Last LFTs were normal. We will plan follow-up in 3 months with CBC, CMP, iron studies prior. Further pending clinical course and the results of the above.      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 ENDOSCOPY;  Service: Gastroenterology;  Laterality: N/A;   • ENDOSCOPY N/A 10/13/2020    Procedure: ESOPHAGOGASTRODUODENOSCOPY WITH possible DILATATION--bx;  Surgeon: Daniel Michael MD;  Location: [facility] ENDOSCOPY;  Service: Gastroenterology;  Laterality: N/A;   • UPPER GASTROINTESTINAL ENDOSCOPY  2020    No blood, no cancer   • WISDOM TOOTH EXTRACTION       Family History   Problem Relation Age of Onset   • Emphysema Mother    • Dementia Mother    • Colon polyps Mother         She lived into her 100th year.   • Cancer Mother         Colon   • COPD Mother         Emphysema   • Depression Mother         OCD   • Diabetes Father    • Cancer Father         Lung   • Hyperlipidemia Father    • No Known Problems Sister    • Hypertension Brother    • Diabetes Paternal Grandmother         Diabetes, high blood pressure   • Hyperlipidemia Paternal Grandmother    • Hypertension Paternal Grandmother    • Cancer Paternal Grandfather         Lung   • Cancer Brother         Prostate   • Other Maternal Grandfather         Lupus; autoimmune   • Hypertension Brother    • Other Sister         Lupus     OB History    No obstetric history on file.       Allergies   Allergen Reactions   • Aspirin GI Intolerance   • Clindamycin/Lincomycin GI Intolerance   • Codeine Nausea And Vomiting   • Lyrica [Pregabalin] Mental Status Change   • Penicillins GI Intolerance   • Hydroxychloroquine Rash   • Latex Rash   • Methotrexate Derivatives Rash     Social History     Socioeconomic History   • Marital  status:    Tobacco Use   • Smoking status: Never   • Smokeless tobacco: Never   Vaping Use   • Vaping Use: Never used   Substance and Sexual Activity   • Alcohol use: Not Currently     Comment: 1986 no more at all due to pregnancy planned   • Drug use: Never   • Sexual activity: Not Currently     Partners: Male     Birth control/protection: Post-menopausal, None     Comment: Prefer male but none current     Current Medications:  Prior to Admission medications    Medication Sig Start Date End Date Taking? Authorizing Provider   acetaminophen (TYLENOL) 500 MG tablet Take 1 tablet by mouth As Needed for Mild Pain.   Yes Khoi Albright MD   ALPHA LIPOIC ACID PO Take 1 tablet by mouth Daily.   Yes Khoi Albright MD   Cyanocobalamin (B-12) 1000 MCG capsule Take 1 tablet by mouth Daily.   Yes Khoi Albright MD   DULoxetine (CYMBALTA) 60 MG capsule Take 1 capsule by mouth Daily. 12/14/22  Yes Tone Simms MD   folic acid (FOLVITE) 400 MCG tablet Take 1 tablet by mouth Daily.   Yes Khoi Albright MD   gabapentin (NEURONTIN) 300 MG capsule Take 1 capsule by mouth Every Night. 3/27/23  Yes Tone Simms MD   omeprazole (priLOSEC) 40 MG capsule TAKE 1 CAPSULE EVERY DAY 2/13/23  Yes Monty Stringer PA-C   Probiotic Product (PROBIOTIC PO) Take 1 capsule by mouth Daily.   Yes Khoi Albright MD   sucralfate (CARAFATE) 1 g tablet TAKE 1 TABLET BY MOUTH EVERY NIGHT AT BEDTIME. 1/12/23  Yes Monty Stringer PA-C   tiZANidine (ZANAFLEX) 4 MG tablet TAKE 1 TABLET EVERY 8 (EIGHT) HOURS AS NEEDED FOR MUSCLE SPASMS. 3/9/23  Yes Tone Simms MD   traZODone (DESYREL) 50 MG tablet TAKE 1 TABLET EVERY NIGHT 12/28/22  Yes Tone Simms MD   vitamin B-6 (PYRIDOXINE) 50 MG tablet Take 25 mg by mouth Daily.   Yes Khoi Albright MD   VITAMIN D PO Take 1 tablet by mouth Daily.   Yes Khoi Albright MD     Review of Systems  Review of Systems       Objective    /85 (BP  "Location: Left arm)   Pulse 92   Ht 165.1 cm (65\")   Wt 99.3 kg (219 lb)   LMP  (LMP Unknown)   SpO2 98%   BMI 36.44 kg/m²   Physical Exam  Vitals and nursing note reviewed.   Constitutional:       General: She is not in acute distress.     Appearance: She is well-developed. She is obese.   HENT:      Head: Normocephalic and atraumatic.   Eyes:      Pupils: Pupils are equal, round, and reactive to light.   Cardiovascular:      Rate and Rhythm: Normal rate and regular rhythm.      Heart sounds: Normal heart sounds.   Pulmonary:      Effort: Pulmonary effort is normal.      Breath sounds: Normal breath sounds.   Abdominal:      General: Bowel sounds are normal. There is no distension or abdominal bruit.      Palpations: Abdomen is soft. Abdomen is not rigid. There is no shifting dullness or mass.      Tenderness: There is abdominal tenderness. There is no guarding or rebound.      Hernia: No hernia is present. There is no hernia in the ventral area.   Musculoskeletal:         General: Normal range of motion.      Cervical back: Normal range of motion.   Skin:     General: Skin is warm and dry.   Neurological:      Mental Status: She is alert and oriented to person, place, and time.   Psychiatric:         Behavior: Behavior normal.         Thought Content: Thought content normal.         Judgment: Judgment normal.       Assessment & Plan      1. Chronic anemia    2. Pain of upper abdomen    3. Gastroesophageal reflux disease with esophagitis without hemorrhage    .   Diagnoses and all orders for this visit:    1. Chronic anemia (Primary)  -     CBC & Differential; Future  -     Comprehensive Metabolic Panel; Future  -     Iron Profile; Future  -     Ferritin; Future    2. Pain of upper abdomen  -     CBC & Differential; Future  -     Comprehensive Metabolic Panel; Future  -     Iron Profile; Future  -     Ferritin; Future    3. Gastroesophageal reflux disease with esophagitis without hemorrhage        Orders " placed during this encounter include:  Orders Placed This Encounter   Procedures   • Comprehensive Metabolic Panel     Standing Status:   Future     Standing Expiration Date:   2023     Order Specific Question:   Release to patient     Answer:   Routine Release   • Iron Profile     Standing Status:   Future     Standing Expiration Date:   2023   • Ferritin     Standing Status:   Future     Standing Expiration Date:   2023   • CBC & Differential     Standing Status:   Future     Standing Expiration Date:   2023     Order Specific Question:   Manual Differential     Answer:   No       Medications prescribed:  No orders of the defined types were placed in this encounter.      Requested Prescriptions      No prescriptions requested or ordered in this encounter       Review and/or summary of lab tests, radiology, procedures, medications. Review and summary of old records and obtaining of history. The risks and benefits of my recommendations, as well as other treatment options were discussed with the patient today. Questions were answered.    Follow-up: Return in about 6 months (around 2023), or if symptoms worsen or fail to improve, for lab prior.     * Surgery not found *      This document has been electronically signed by Monty Stringer PA-C on May 30, 2023 20:13 CDT      Results for orders placed or performed during the hospital encounter of 23   TISSUE EXAM, [facility] ([facility])    Specimen: Large Intestine, Sigmoid Colon; Polyp   Result Value Ref Range    Reference Lab Report       [facility]  [address] [place] [address]  Phone: [phone] or [phone]  Fax: [phone]  Dixon Mendoza M.D., Medical Director    PATIENT NAME                                     LABORATORY NO.  PADMINI PAUL.                           [id number]  [id number]                                 AGE                    SEX   SSN               CLIENT REF #  [facility]                   72        1950      F     [id number]      [id number]    [place]                               REQUESTING M.D.           ATTENDING M.D.         COPY TO.  [address] [place] [address]                         [place] [address]                     DATE COLLECTED            DATE RECEIVED          DATE REPORTED  03/06/2023 03/06/2023 03/08/2023    DIAGNOSIS:  SIGMOID COLON POLYP:  Tubular adenoma  Negative for carcinoma or high-grade dysplasia    CLINICAL  HISTORY:  History of colon polyps    SPECIMENS RECEIVED:  SIGMOID COLON POLYP    MICROSCOPIC DESCRIPTION:  Tissue blocks are prepared and slides are examined microscopically on all  specimens. See diagnosis for details.    Professional interpretation rendered by Kian Pinto M.D.,ARANZA at [facility], [address] [place] [address].    GROSS DESCRIPTION:  Labeled sigmoid colon polyp is a 0.4 x 0.3 x 0.3 cm portion of tan soft tissue,  submitted entirely in 1 block.  MTH    REVIEWED, DIAGNOSED AND ELECTRONICALLY  SIGNED BY:    Kian Pinto M.D.,FSebastianC.A.P.  CPT CODES:  98628     Results for orders placed or performed in visit on 02/07/23   CBC Auto Differential    Specimen: Blood   Result Value Ref Range    WBC 6.86 3.40 - 10.80 10*3/mm3    RBC 5.24 3.77 - 5.28 10*6/mm3    Hemoglobin 11.1 (L) 12.0 - 15.9 g/dL    Hematocrit 35.1 34.0 - 46.6 %    MCV 67.0 (L) 79.0 - 97.0 fL    MCH 21.2 (L) 26.6 - 33.0 pg    MCHC 31.6 31.5 - 35.7 g/dL    RDW 16.2 (H) 12.3 - 15.4 %    RDW-SD 37.5 37.0 - 54.0 fl    MPV 10.6 6.0 - 12.0 fL    Platelets 306 140 - 450 10*3/mm3    nRBC 0.1 0.0 - 0.2 /100 WBC   Manual Differential    Specimen: Blood   Result Value Ref Range    Neutrophil % 29.0 (L) 42.7 - 76.0 %    Lymphocyte % 57.0 (H) 19.6 - 45.3 %    Monocyte % 7.0 5.0 - 12.0 %    Eosinophil % 6.0 0.3 - 6.2 %    Basophil % 1.0 0.0 - 1.5 %     Neutrophils Absolute 1.99 1.70 - 7.00 10*3/mm3    Lymphocytes Absolute 3.91 (H) 0.70 - 3.10 10*3/mm3    Monocytes Absolute 0.48 0.10 - 0.90 10*3/mm3    Eosinophils Absolute 0.41 (H) 0.00 - 0.40 10*3/mm3    Basophils Absolute 0.07 0.00 - 0.20 10*3/mm3    Microcytes Slight/1+ None Seen    WBC Morphology Normal Normal    Platelet Morphology Normal Normal   Protime-INR    Specimen: Blood   Result Value Ref Range    Protime 24.0 (H) 11.1 - 15.3 Seconds    INR 2.14 (H) 0.80 - 1.20   Comprehensive Metabolic Panel    Specimen: Blood   Result Value Ref Range    Glucose 112 (H) 65 - 99 mg/dL    BUN 11 8 - 23 mg/dL    Creatinine 1.04 (H) 0.57 - 1.00 mg/dL    Sodium 139 136 - 145 mmol/L    Potassium 4.5 3.5 - 5.2 mmol/L    Chloride 102 98 - 107 mmol/L    CO2 25.4 22.0 - 29.0 mmol/L    Calcium 9.5 8.6 - 10.5 mg/dL    Total Protein 6.9 6.0 - 8.5 g/dL    Albumin 4.4 3.5 - 5.2 g/dL    ALT (SGPT) 16 1 - 33 U/L    AST (SGOT) 18 1 - 32 U/L    Alkaline Phosphatase 66 39 - 117 U/L    Total Bilirubin 0.7 0.0 - 1.2 mg/dL    Globulin 2.5 gm/dL    A/G Ratio 1.8 g/dL    BUN/Creatinine Ratio 10.6 7.0 - 25.0    Anion Gap 11.6 5.0 - 15.0 mmol/L    eGFR 57.6 (L) >60.0 mL/min/1.73   Results for orders placed or performed in visit on 05/12/22   DNA / DS    Specimen: Blood   Result Value Ref Range    Anti-DNA (DS) Ab Qn <1 0 - 9 IU/mL    See below: Comment    Antistreptolysin O screen    Specimen: Blood   Result Value Ref Range    ASO Negative Negative   Rheumatoid factor    Specimen: Blood   Result Value Ref Range    Rheumatoid Factor Quantitative <10.0 0.0 - 14.0 IU/mL   C-reactive protein    Specimen: Blood   Result Value Ref Range    C-Reactive Protein 0.37 0.00 - 0.50 mg/dL   KATHLEEN    Specimen: Blood   Result Value Ref Range    KATHLEEN Direct Positive (A) Negative   Uric acid    Specimen: Blood   Result Value Ref Range    Uric Acid 5.7 2.4 - 5.7 mg/dL   Results for orders placed or performed in visit on 12/13/21   Occult Blood, Fecal By Immunoassay  - Stool, Per Rectum    Specimen: Per Rectum; Stool   Result Value Ref Range    Occult Blood, Fecal by Immunoassay Negative Negative   Occult Blood X 3, Stool - Stool, Per Rectum    Specimen: Per Rectum; Stool   Result Value Ref Range    Occult Blood Negative Negative    Occult Blood 2 Negative Negative    Occult Blood 3 Negative Negative    OB Date 1 12/7/2021     OB Date 2 12/8/2021     OB Date 3 12/10/2021    Results for orders placed or performed in visit on 08/12/21   Lipid Panel    Specimen: Blood   Result Value Ref Range    Total Cholesterol 143 0 - 200 mg/dL    Triglycerides 115 0 - 150 mg/dL    HDL Cholesterol 40 40 - 60 mg/dL    LDL Cholesterol  82 0 - 100 mg/dL    VLDL Cholesterol 21 5 - 40 mg/dL    LDL/HDL Ratio 2.00    Results for orders placed or performed in visit on 08/12/21   HCV Antibody Rfx To Qnt PCR    Specimen: Blood   Result Value Ref Range    Hepatitis C Ab <0.1 0.0 - 0.9 s/co ratio   Interpretation:    Specimen: Blood   Result Value Ref Range    Interpretation Comment    CBC Auto Differential    Specimen: Blood   Result Value Ref Range    WBC 5.69 3.40 - 10.80 10*3/mm3    RBC 5.04 3.77 - 5.28 10*6/mm3    Hemoglobin 10.6 (L) 12.0 - 15.9 g/dL    Hematocrit 34.6 34.0 - 46.6 %    MCV 68.7 (L) 79.0 - 97.0 fL    MCH 21.0 (L) 26.6 - 33.0 pg    MCHC 30.6 (L) 31.5 - 35.7 g/dL    RDW 16.3 (H) 12.3 - 15.4 %    RDW-SD 38.6 37.0 - 54.0 fl    MPV 10.4 6.0 - 12.0 fL    Platelets 301 140 - 450 10*3/mm3   Iron Profile    Specimen: Blood   Result Value Ref Range    Iron 154 (H) 37 - 145 mcg/dL    Iron Saturation 46 20 - 50 %    Transferrin 224 200 - 360 mg/dL    TIBC 334 298 - 536 mcg/dL   Manual Differential    Specimen: Blood   Result Value Ref Range    Neutrophil % 44.3 42.7 - 76.0 %    Lymphocyte % 50.5 (H) 19.6 - 45.3 %    Monocyte % 3.1 (L) 5.0 - 12.0 %    Eosinophil % 1.0 0.3 - 6.2 %    Basophil % 1.0 0.0 - 1.5 %    Neutrophils Absolute 2.52 1.70 - 7.00 10*3/mm3    Lymphocytes Absolute 2.87 0.70 - 3.10  10*3/mm3    Monocytes Absolute 0.18 0.10 - 0.90 10*3/mm3    Eosinophils Absolute 0.06 0.00 - 0.40 10*3/mm3    Basophils Absolute 0.06 0.00 - 0.20 10*3/mm3    Anisocytosis Mod/2+ None Seen    Basophilic Stippling Slight/1+ None Seen    [place] Cells Mod/2+ None Seen    Microcytes Slight/1+ None Seen    Poikilocytes Mod/2+ None Seen    Smudge Cells Slight/1+ None Seen    Platelet Morphology Normal Normal     *Note: Due to a large number of results and/or encounters for the requested time period, some results have not been displayed. A complete set of results can be found in Results Review.

## 2023-06-14 RX ORDER — SUCRALFATE 1 G/1
TABLET ORAL
Qty: 90 TABLET | Refills: 2 | Status: SHIPPED | OUTPATIENT
Start: 2023-06-14

## 2023-08-06 DIAGNOSIS — G47.00 INSOMNIA, UNSPECIFIED TYPE: ICD-10-CM

## 2023-08-07 RX ORDER — TRAZODONE HYDROCHLORIDE 50 MG/1
TABLET ORAL
Qty: 90 TABLET | Refills: 0 | Status: SHIPPED | OUTPATIENT
Start: 2023-08-07

## 2023-09-20 RX ORDER — OMEPRAZOLE 40 MG/1
CAPSULE, DELAYED RELEASE ORAL
Qty: 90 CAPSULE | Refills: 0 | Status: SHIPPED | OUTPATIENT
Start: 2023-09-20

## (undated) DEVICE — CANN SMPL SOFTECH BIFLO ETCO2 A/M 7FT

## (undated) DEVICE — Device: Brand: DISPOSABLE ELECTROSURGICAL SNARE

## (undated) DEVICE — TRAP SXN POLYP QUICKCATCH LF

## (undated) DEVICE — SINGLE-USE BIOPSY FORCEPS: Brand: RADIAL JAW 4

## (undated) DEVICE — BITEBLOCK ENDO W/STRAP 60F A/ LF DISP